# Patient Record
Sex: MALE | Race: ASIAN | NOT HISPANIC OR LATINO | Employment: FULL TIME | URBAN - METROPOLITAN AREA
[De-identification: names, ages, dates, MRNs, and addresses within clinical notes are randomized per-mention and may not be internally consistent; named-entity substitution may affect disease eponyms.]

---

## 2017-01-18 ENCOUNTER — HOSPITAL ENCOUNTER (OUTPATIENT)
Dept: OTHER | Facility: HOSPITAL | Age: 42
Setting detail: SPECIMEN
Discharge: HOME | End: 2017-01-18
Attending: FAMILY MEDICINE | Admitting: FAMILY MEDICINE

## 2017-01-18 LAB
ALBUMIN SERPL-MCNC: 3.9 G/DL (ref 3.5–4.8)
ALBUMIN/GLOB SERPL: 1.4 {RATIO} (ref 1–1.7)
ALP SERPL-CCNC: 36 IU/L (ref 32–91)
ALT SERPL-CCNC: 27 IU/L (ref 17–63)
ANION GAP SERPL CALC-SCNC: 12.2 MMOL/L (ref 10–20)
AST SERPL-CCNC: 26 IU/L (ref 15–41)
BASOPHILS # BLD AUTO: 0 10*3/UL (ref 0–0.2)
BASOPHILS NFR BLD AUTO: 1 % (ref 0–2)
BILIRUB SERPL-MCNC: 0.9 MG/DL (ref 0.3–1.2)
BUN SERPL-MCNC: 8 MG/DL (ref 8–20)
BUN/CREAT SERPL: 8.9 (ref 6.2–20.3)
CALCIUM SERPL-MCNC: 8.7 MG/DL (ref 8.9–10.3)
CHLORIDE SERPL-SCNC: 109 MMOL/L (ref 101–111)
CHOLEST SERPL-MCNC: 170 MG/DL
CHOLEST/HDLC SERPL: 3.3 {RATIO}
CONV CO2: 21 MMOL/L (ref 22–32)
CONV LDL CHOLESTEROL DIRECT: 98 MG/DL (ref 0–100)
CONV TOTAL PROTEIN: 6.6 G/DL (ref 6.1–7.9)
CREAT UR-MCNC: 0.9 MG/DL (ref 0.7–1.2)
DIFFERENTIAL METHOD BLD: (no result)
EOSINOPHIL # BLD AUTO: 0.1 10*3/UL (ref 0–0.3)
EOSINOPHIL # BLD AUTO: 2 % (ref 0–3)
ERYTHROCYTE [DISTWIDTH] IN BLOOD BY AUTOMATED COUNT: 13.2 % (ref 11.5–14.5)
GLOBULIN UR ELPH-MCNC: 2.7 G/DL (ref 2.5–3.8)
GLUCOSE SERPL-MCNC: 125 MG/DL (ref 65–99)
HCT VFR BLD AUTO: 40 % (ref 40–54)
HDLC SERPL-MCNC: 51 MG/DL
HGB BLD-MCNC: 13.5 G/DL (ref 14–18)
LDLC/HDLC SERPL: 1.9 {RATIO}
LIPID INTERPRETATION: ABNORMAL
LYMPHOCYTES # BLD AUTO: 2.3 10*3/UL (ref 0.8–4.8)
LYMPHOCYTES NFR BLD AUTO: 38 % (ref 18–42)
MCH RBC QN AUTO: 28.3 PG (ref 26–32)
MCHC RBC AUTO-ENTMCNC: 33.8 G/DL (ref 32–36)
MCV RBC AUTO: 83.6 FL (ref 80–94)
MONOCYTES # BLD AUTO: 0.4 10*3/UL (ref 0.1–1.3)
MONOCYTES NFR BLD AUTO: 6 % (ref 2–11)
NEUTROPHILS # BLD AUTO: 3.1 10*3/UL (ref 2.3–8.6)
NEUTROPHILS NFR BLD AUTO: 53 % (ref 50–75)
NRBC BLD AUTO-RTO: 0 /100{WBCS}
NRBC/RBC NFR BLD MANUAL: 0 10*3/UL
PLATELET # BLD AUTO: 190 10*3/UL (ref 150–450)
PMV BLD AUTO: 10.4 FL (ref 7.4–10.4)
POTASSIUM SERPL-SCNC: 4.2 MMOL/L (ref 3.6–5.1)
RBC # BLD AUTO: 4.79 10*6/UL (ref 4.6–6)
SODIUM SERPL-SCNC: 138 MMOL/L (ref 136–144)
T3 SERPL-MCNC: 0.92 NG/ML (ref 0.87–1.78)
T4 SERPL-MCNC: 10.41 UG/DL (ref 6.1–12.2)
TRIGL SERPL-MCNC: 186 MG/DL
VLDLC SERPL CALC-MCNC: 21 MG/DL
WBC # BLD AUTO: 5.9 10*3/UL (ref 4.5–11.5)

## 2017-08-25 ENCOUNTER — HOSPITAL ENCOUNTER (OUTPATIENT)
Dept: OTHER | Facility: HOSPITAL | Age: 42
Setting detail: SPECIMEN
Discharge: HOME | End: 2017-08-25
Attending: FAMILY MEDICINE | Admitting: FAMILY MEDICINE

## 2017-08-25 LAB
ANION GAP SERPL CALC-SCNC: 11.3 MMOL/L (ref 10–20)
BASOPHILS # BLD AUTO: 0.1 10*3/UL (ref 0–0.2)
BASOPHILS NFR BLD AUTO: 1 % (ref 0–2)
BUN SERPL-MCNC: 11 MG/DL (ref 8–20)
BUN/CREAT SERPL: 15.7 (ref 6.2–20.3)
CALCIUM SERPL-MCNC: 8.9 MG/DL (ref 8.9–10.3)
CHLORIDE SERPL-SCNC: 107 MMOL/L (ref 101–111)
CONV CO2: 25 MMOL/L (ref 22–32)
CREAT UR-MCNC: 0.7 MG/DL (ref 0.7–1.2)
DIFFERENTIAL METHOD BLD: (no result)
EOSINOPHIL # BLD AUTO: 0.2 10*3/UL (ref 0–0.3)
EOSINOPHIL # BLD AUTO: 2 % (ref 0–3)
ERYTHROCYTE [DISTWIDTH] IN BLOOD BY AUTOMATED COUNT: 13.2 % (ref 11.5–14.5)
GLUCOSE SERPL-MCNC: 106 MG/DL (ref 65–99)
HCT VFR BLD AUTO: 41.4 % (ref 40–54)
HGB BLD-MCNC: 13.9 G/DL (ref 14–18)
LYMPHOCYTES # BLD AUTO: 3.1 10*3/UL (ref 0.8–4.8)
LYMPHOCYTES NFR BLD AUTO: 40 % (ref 18–42)
MCH RBC QN AUTO: 28.4 PG (ref 26–32)
MCHC RBC AUTO-ENTMCNC: 33.5 G/DL (ref 32–36)
MCV RBC AUTO: 84.7 FL (ref 80–94)
MONOCYTES # BLD AUTO: 0.4 10*3/UL (ref 0.1–1.3)
MONOCYTES NFR BLD AUTO: 6 % (ref 2–11)
NEUTROPHILS # BLD AUTO: 3.9 10*3/UL (ref 2.3–8.6)
NEUTROPHILS NFR BLD AUTO: 51 % (ref 50–75)
NRBC BLD AUTO-RTO: 0 /100{WBCS}
NRBC/RBC NFR BLD MANUAL: 0 10*3/UL
PLATELET # BLD AUTO: 220 10*3/UL (ref 150–450)
PMV BLD AUTO: 9.2 FL (ref 7.4–10.4)
POTASSIUM SERPL-SCNC: 4.3 MMOL/L (ref 3.6–5.1)
RBC # BLD AUTO: 4.89 10*6/UL (ref 4.6–6)
SODIUM SERPL-SCNC: 139 MMOL/L (ref 136–144)
T3 SERPL-MCNC: 0.88 NG/ML (ref 0.87–1.78)
T4 SERPL-MCNC: 7.52 UG/DL (ref 6.1–12.2)
WBC # BLD AUTO: 7.7 10*3/UL (ref 4.5–11.5)

## 2018-06-20 ENCOUNTER — HOSPITAL ENCOUNTER (OUTPATIENT)
Dept: OTHER | Facility: HOSPITAL | Age: 43
Setting detail: SPECIMEN
Discharge: HOME | End: 2018-06-20
Attending: FAMILY MEDICINE | Admitting: FAMILY MEDICINE

## 2018-06-20 LAB
ALBUMIN SERPL-MCNC: 4 G/DL (ref 3.5–4.8)
ALBUMIN/GLOB SERPL: 1.4 {RATIO} (ref 1–1.7)
ALP SERPL-CCNC: 40 IU/L (ref 32–91)
ALT SERPL-CCNC: 37 IU/L (ref 17–63)
ANION GAP SERPL CALC-SCNC: 13.9 MMOL/L (ref 10–20)
AST SERPL-CCNC: 30 IU/L (ref 15–41)
BASOPHILS # BLD AUTO: 0.1 10*3/UL (ref 0–0.2)
BASOPHILS NFR BLD AUTO: 1 % (ref 0–2)
BILIRUB SERPL-MCNC: 1.2 MG/DL (ref 0.3–1.2)
BUN SERPL-MCNC: 10 MG/DL (ref 8–20)
BUN/CREAT SERPL: 12.5 (ref 6.2–20.3)
CALCIUM SERPL-MCNC: 8.7 MG/DL (ref 8.9–10.3)
CHLORIDE SERPL-SCNC: 103 MMOL/L (ref 101–111)
CHOLEST SERPL-MCNC: 215 MG/DL
CHOLEST/HDLC SERPL: 4.2 {RATIO}
CONV CO2: 22 MMOL/L (ref 22–32)
CONV LDL CHOLESTEROL DIRECT: 122 MG/DL (ref 0–100)
CONV TOTAL PROTEIN: 6.8 G/DL (ref 6.1–7.9)
CREAT UR-MCNC: 0.8 MG/DL (ref 0.7–1.2)
DIFFERENTIAL METHOD BLD: (no result)
EOSINOPHIL # BLD AUTO: 0.2 10*3/UL (ref 0–0.3)
EOSINOPHIL # BLD AUTO: 3 % (ref 0–3)
ERYTHROCYTE [DISTWIDTH] IN BLOOD BY AUTOMATED COUNT: 12.9 % (ref 11.5–14.5)
GLOBULIN UR ELPH-MCNC: 2.8 G/DL (ref 2.5–3.8)
GLUCOSE SERPL-MCNC: 130 MG/DL (ref 65–99)
HCT VFR BLD AUTO: 42.2 % (ref 40–54)
HDLC SERPL-MCNC: 51 MG/DL
HGB BLD-MCNC: 14.3 G/DL (ref 14–18)
LDLC/HDLC SERPL: 2.4 {RATIO}
LIPID INTERPRETATION: ABNORMAL
LYMPHOCYTES # BLD AUTO: 2 10*3/UL (ref 0.8–4.8)
LYMPHOCYTES NFR BLD AUTO: 31 % (ref 18–42)
MCH RBC QN AUTO: 28.7 PG (ref 26–32)
MCHC RBC AUTO-ENTMCNC: 33.9 G/DL (ref 32–36)
MCV RBC AUTO: 84.7 FL (ref 80–94)
MONOCYTES # BLD AUTO: 0.6 10*3/UL (ref 0.1–1.3)
MONOCYTES NFR BLD AUTO: 10 % (ref 2–11)
NEUTROPHILS # BLD AUTO: 3.6 10*3/UL (ref 2.3–8.6)
NEUTROPHILS NFR BLD AUTO: 55 % (ref 50–75)
NRBC BLD AUTO-RTO: 0 /100{WBCS}
NRBC/RBC NFR BLD MANUAL: 0 10*3/UL
PLATELET # BLD AUTO: 206 10*3/UL (ref 150–450)
PMV BLD AUTO: 9.5 FL (ref 7.4–10.4)
POTASSIUM SERPL-SCNC: 3.9 MMOL/L (ref 3.6–5.1)
RBC # BLD AUTO: 4.98 10*6/UL (ref 4.6–6)
SODIUM SERPL-SCNC: 135 MMOL/L (ref 136–144)
TRIGL SERPL-MCNC: 244 MG/DL
VLDLC SERPL CALC-MCNC: 42.2 MG/DL
WBC # BLD AUTO: 6.5 10*3/UL (ref 4.5–11.5)

## 2018-12-19 ENCOUNTER — HOSPITAL ENCOUNTER (OUTPATIENT)
Dept: OTHER | Facility: HOSPITAL | Age: 43
Setting detail: SPECIMEN
Discharge: HOME | End: 2018-12-19
Attending: FAMILY MEDICINE | Admitting: FAMILY MEDICINE

## 2018-12-19 LAB
ALBUMIN SERPL-MCNC: 4.1 G/DL (ref 3.5–4.8)
ALBUMIN/GLOB SERPL: 1.2 {RATIO} (ref 1–1.7)
ALP SERPL-CCNC: 43 IU/L (ref 32–91)
ALT SERPL-CCNC: 35 IU/L (ref 17–63)
ANION GAP SERPL CALC-SCNC: 15.4 MMOL/L (ref 10–20)
AST SERPL-CCNC: 28 IU/L (ref 15–41)
BILIRUB SERPL-MCNC: 0.7 MG/DL (ref 0.3–1.2)
BUN SERPL-MCNC: 14 MG/DL (ref 8–20)
BUN/CREAT SERPL: 17.5 (ref 6.2–20.3)
CALCIUM SERPL-MCNC: 9.3 MG/DL (ref 8.9–10.3)
CHLORIDE SERPL-SCNC: 102 MMOL/L (ref 101–111)
CHOLEST SERPL-MCNC: 236 MG/DL
CHOLEST/HDLC SERPL: 4.4 {RATIO}
CONV CO2: 22 MMOL/L (ref 22–32)
CONV LDL CHOLESTEROL DIRECT: 105 MG/DL (ref 0–100)
CONV TOTAL PROTEIN: 7.5 G/DL (ref 6.1–7.9)
CREAT UR-MCNC: 0.8 MG/DL (ref 0.7–1.2)
GLOBULIN UR ELPH-MCNC: 3.4 G/DL (ref 2.5–3.8)
GLUCOSE SERPL-MCNC: 136 MG/DL (ref 65–99)
HDLC SERPL-MCNC: 53 MG/DL
LDLC/HDLC SERPL: 2 {RATIO}
LIPID INTERPRETATION: ABNORMAL
POTASSIUM SERPL-SCNC: 4.4 MMOL/L (ref 3.6–5.1)
SODIUM SERPL-SCNC: 135 MMOL/L (ref 136–144)
T3 SERPL-MCNC: 0.81 NG/ML (ref 0.87–1.78)
T4 SERPL-MCNC: 9.67 UG/DL (ref 6.1–12.2)
TRIGL SERPL-MCNC: 409 MG/DL
VLDLC SERPL CALC-MCNC: 77.4 MG/DL

## 2018-12-20 LAB — HBA1C MFR BLD: 7.3 % (ref 0–5.6)

## 2019-11-06 ENCOUNTER — OFFICE VISIT (OUTPATIENT)
Dept: FAMILY MEDICINE CLINIC | Facility: CLINIC | Age: 44
End: 2019-11-06

## 2019-11-06 VITALS
BODY MASS INDEX: 26.67 KG/M2 | SYSTOLIC BLOOD PRESSURE: 124 MMHG | HEIGHT: 68 IN | OXYGEN SATURATION: 98 % | WEIGHT: 176 LBS | DIASTOLIC BLOOD PRESSURE: 77 MMHG | HEART RATE: 62 BPM | TEMPERATURE: 98.2 F

## 2019-11-06 DIAGNOSIS — E11.9 TYPE 2 DIABETES MELLITUS WITHOUT COMPLICATION, WITHOUT LONG-TERM CURRENT USE OF INSULIN (HCC): ICD-10-CM

## 2019-11-06 DIAGNOSIS — L30.9 ECZEMA, UNSPECIFIED TYPE: ICD-10-CM

## 2019-11-06 DIAGNOSIS — E03.9 ACQUIRED HYPOTHYROIDISM: ICD-10-CM

## 2019-11-06 DIAGNOSIS — R55 SYNCOPE AND COLLAPSE: ICD-10-CM

## 2019-11-06 DIAGNOSIS — R42 DIZZINESS: Primary | ICD-10-CM

## 2019-11-06 DIAGNOSIS — I49.9 IRREGULAR HEART BEAT: ICD-10-CM

## 2019-11-06 DIAGNOSIS — Z23 FLU VACCINE NEED: ICD-10-CM

## 2019-11-06 DIAGNOSIS — Z48.02 ENCOUNTER FOR STAPLE REMOVAL: ICD-10-CM

## 2019-11-06 DIAGNOSIS — E78.2 MULTIPLE-TYPE HYPERLIPIDEMIA: ICD-10-CM

## 2019-11-06 LAB
DEPRECATED RDW RBC AUTO: 39.5 FL (ref 37–54)
ERYTHROCYTE [DISTWIDTH] IN BLOOD BY AUTOMATED COUNT: 12.5 % (ref 12.3–15.4)
HBA1C MFR BLD: 5.9 % (ref 3.5–5.6)
HCT VFR BLD AUTO: 44 % (ref 37.5–51)
HGB BLD-MCNC: 14.4 G/DL (ref 13–17.7)
MCH RBC QN AUTO: 28.3 PG (ref 26.6–33)
MCHC RBC AUTO-ENTMCNC: 32.7 G/DL (ref 31.5–35.7)
MCV RBC AUTO: 86.6 FL (ref 79–97)
PLATELET # BLD AUTO: 222 10*3/MM3 (ref 140–450)
PMV BLD AUTO: 11.5 FL (ref 6–12)
RBC # BLD AUTO: 5.08 10*6/MM3 (ref 4.14–5.8)
WBC NRBC COR # BLD: 7.16 10*3/MM3 (ref 3.4–10.8)

## 2019-11-06 PROCEDURE — 80061 LIPID PANEL: CPT | Performed by: NURSE PRACTITIONER

## 2019-11-06 PROCEDURE — 99214 OFFICE O/P EST MOD 30 MIN: CPT | Performed by: NURSE PRACTITIONER

## 2019-11-06 PROCEDURE — 36415 COLL VENOUS BLD VENIPUNCTURE: CPT | Performed by: NURSE PRACTITIONER

## 2019-11-06 PROCEDURE — 85027 COMPLETE CBC AUTOMATED: CPT | Performed by: NURSE PRACTITIONER

## 2019-11-06 PROCEDURE — 90674 CCIIV4 VAC NO PRSV 0.5 ML IM: CPT | Performed by: NURSE PRACTITIONER

## 2019-11-06 PROCEDURE — 90471 IMMUNIZATION ADMIN: CPT | Performed by: NURSE PRACTITIONER

## 2019-11-06 PROCEDURE — 80048 BASIC METABOLIC PNL TOTAL CA: CPT | Performed by: NURSE PRACTITIONER

## 2019-11-06 PROCEDURE — 84443 ASSAY THYROID STIM HORMONE: CPT | Performed by: NURSE PRACTITIONER

## 2019-11-06 PROCEDURE — 83036 HEMOGLOBIN GLYCOSYLATED A1C: CPT | Performed by: NURSE PRACTITIONER

## 2019-11-06 RX ORDER — LEVOTHYROXINE SODIUM 175 UG/1
175 TABLET ORAL DAILY
Refills: 1 | COMMUNITY
Start: 2019-11-01 | End: 2019-12-23 | Stop reason: SDUPTHER

## 2019-11-06 RX ORDER — CLOBETASOL PROPIONATE 0.5 MG/G
OINTMENT TOPICAL
Qty: 30 G | Refills: 1 | Status: SHIPPED | OUTPATIENT
Start: 2019-11-06 | End: 2021-08-26

## 2019-11-06 NOTE — PROGRESS NOTES
"  Naveen Zapata is a 44 y.o. male.     Chief Complaint   Patient presents with   • Loss of Consciousness     Injured chin and head, would like for you to look at stitches and staples to have removed.  Happened two weeks ago.   • Dizziness     Since the fall.   • Eczema     Patient would like for you to look at patch of dry skin and would like ointment.       History of Present Illness as mentioned above in CC, he reports he travels a lot for work and was working out at the [employer] gym at 4 am 2 weeks ago, he felt funny and went to go to his room and fell hitting his head on the floor by the elevator, he went by EMS to [facility]. EMS reports irregular HR, he felt no chest pain, but some irregular HR feeling. All cardiac work up ok, including echo, Negative stress test and CT scan brain normal. He has 3 stitches in his chin and one staple in the back of his head. He reports the headache has resolved, although he still feels dizzy when looking up/down. Lying to standing develops dizziness as well. Mother had cabg at age 61-62. father  of cardiac arrest at age 63    DMII he reports a hgba1c 6.1 at hospital, eats well/works out, doesn't sleep well wakes up after 4 hours, then can't go back to sleep, checks BG once weekly, needs strips pt doesn't remember name of glucometer.     TSH \"low at hospital\"    Eczema comes and goes in one spot to the right of the belly button    Subjective  as mentioned above    Vitals:    19 0821   BP: 124/77   Pulse: 62   Temp: 98.2 °F (36.8 °C)   SpO2: 98%       The following portions of the patient's history were reviewed and updated as appropriate: allergies, current medications, past family history, past medical history, past social history, past surgical history and problem list.    Review of Systems   Constitutional: Negative for chills, fatigue and fever.   HENT: Negative for dental problem, ear pain, sinus pressure and sore throat.         Sutures present in chin and 1 " staple in back of the head     Eyes: Negative for visual disturbance.   Respiratory: Negative for cough, shortness of breath and wheezing.    Gastrointestinal: Negative for abdominal pain, blood in stool, constipation, diarrhea, nausea, vomiting and GERD.   Genitourinary: Negative for difficulty urinating, frequency, urgency and urinary incontinence.   Musculoskeletal: Negative for arthralgias, back pain, gait problem, joint swelling, myalgias and neck pain.   Skin: Negative for dry skin, pallor and rash.   Neurological: Positive for dizziness, syncope (one isolated episode), light-headedness and headache (now resolved). Negative for seizures, speech difficulty, weakness, memory problem and confusion.   Hematological: Negative for adenopathy.   Psychiatric/Behavioral: Positive for sleep disturbance. Negative for depressed mood and stress. The patient is not nervous/anxious.        Objective     Physical Exam   Constitutional: He is oriented to person, place, and time. He appears well-developed and well-nourished. No distress.   HENT:   Head: Normocephalic. Head is with laceration (lacerations as noted with sutures/staples present, both lac's healed well, approximated, no erythema or drainage. ).       Eyes: Conjunctivae and EOM are normal. Pupils are equal, round, and reactive to light.   Neck: Normal range of motion. Neck supple. No JVD present. No thyromegaly present.   Cardiovascular: Normal rate, regular rhythm, S1 normal, S2 normal and normal heart sounds. Exam reveals no gallop, no S3, no S4, no distant heart sounds and no friction rub.   No murmur heard.  Pulmonary/Chest: Effort normal and breath sounds normal.   Abdominal: Soft. Bowel sounds are normal. He exhibits no distension. There is no tenderness.   Musculoskeletal: Normal range of motion. He exhibits no edema or tenderness.   Neurological: He is alert and oriented to person, place, and time. No sensory deficit.   Skin: Skin is warm and dry. Rash (dry  scale/patch laterally to the right of umbilicus) noted. He is not diaphoretic. No erythema.   Psychiatric: He has a normal mood and affect. His behavior is normal. Judgment normal.   Nursing note and vitals reviewed.        Assessment/Plan   Naveen was seen today for loss of consciousness, dizziness and eczema.    Diagnoses and all orders for this visit:    Dizziness  -     Basic Metabolic Panel  -     CBC (No Diff)    Syncope and collapse  -     Ambulatory Referral to Cardiology    Encounter for staple removal    Multiple-type hyperlipidemia  -     Lipid Panel    Type 2 diabetes mellitus without complication, without long-term current use of insulin (CMS/[facility])  -     Hemoglobin A1c    Eczema, unspecified type    Acquired hypothyroidism  -     TSH    Irregular heart beat  -     Ambulatory Referral to Cardiology    Other orders  -     clobetasol (TEMOVATE) 0.05 % ointment; Apply BID to affected area as needed.      Procedure: x 3 sutures removed from distal chin area was cleansed with alcohol prior to removal.  The laceration was approximated, patient tolerated well.  Then x1 staple on the posterior head was cleansed with alcohol and removed with a staple removal kit without difficulty, the patient tolerated the procedure well.      Request records from [facility], [facility] in [place], can make copies for pt to take to cardiology appt. Pt should request echo/stress disc to be mailed.    pt is fasting today, check labs, notify results   Ok for clotrimazole to eczematous area  Flu shot today  Staple/suture removal   Does not need med refills at this time. Will review labs make changes to meds if needed and see pt back in 6 months, otherwise earlier as needed.         Glucose   Date Value Ref Range Status   12/19/2018 136 (H) 65 - 99 mg/dL Final     BUN   Date Value Ref Range Status   12/19/2018 14 8 - 20 mg/dL Final     Creatinine   Date Value Ref Range Status   12/19/2018 0.8 0.7 - 1.2 mg/dl  Final     Sodium   Date Value Ref Range Status   12/19/2018 135 (L) 136 - 144 mmol/L Final     Potassium   Date Value Ref Range Status   12/19/2018 4.4 3.6 - 5.1 mmol/L Final     Chloride   Date Value Ref Range Status   12/19/2018 102 101 - 111 mmol/L Final     CO2   Date Value Ref Range Status   12/19/2018 22 22 - 32 mmol/L Final     Calcium   Date Value Ref Range Status   12/19/2018 9.3 8.9 - 10.3 mg/dL Final     Total Protein   Date Value Ref Range Status   12/19/2018 7.5 6.1 - 7.9 g/dL Final     Albumin   Date Value Ref Range Status   12/19/2018 4.1 3.5 - 4.8 g/dL Final     ALT (SGPT)   Date Value Ref Range Status   12/19/2018 35 17 - 63 IU/L Final     AST (SGOT)   Date Value Ref Range Status   12/19/2018 28 15 - 41 IU/L Final     Alkaline Phosphatase   Date Value Ref Range Status   12/19/2018 43 32 - 91 IU/L Final     Total Bilirubin   Date Value Ref Range Status   12/19/2018 0.7 0.3 - 1.2 mg/dL Final     A/G Ratio   Date Value Ref Range Status   12/19/2018 1.2 1.0 - 1.7 Final     BUN/Creatinine Ratio   Date Value Ref Range Status   12/19/2018 17.5 6.2 - 20.3 Final     Anion Gap   Date Value Ref Range Status   12/19/2018 15.4 10 - 20 Final

## 2019-11-06 NOTE — PATIENT INSTRUCTIONS
Will request records and may need cardiology work up  Will get labs today and notify results  Ok for clotrimazole   Flu shot  Staple/suture removal.

## 2019-11-07 LAB
ANION GAP SERPL CALCULATED.3IONS-SCNC: 14.2 MMOL/L (ref 5–15)
BUN BLD-MCNC: 10 MG/DL (ref 6–20)
BUN/CREAT SERPL: 14.1 (ref 7–25)
CALCIUM SPEC-SCNC: 9.3 MG/DL (ref 8.6–10.5)
CHLORIDE SERPL-SCNC: 102 MMOL/L (ref 98–107)
CHOLEST SERPL-MCNC: 188 MG/DL (ref 0–200)
CO2 SERPL-SCNC: 22.8 MMOL/L (ref 22–29)
CREAT BLD-MCNC: 0.71 MG/DL (ref 0.76–1.27)
GFR SERPL CREATININE-BSD FRML MDRD: 121 ML/MIN/1.73
GFR SERPL CREATININE-BSD FRML MDRD: 147 ML/MIN/1.73
GLUCOSE BLD-MCNC: 116 MG/DL (ref 65–99)
HDLC SERPL-MCNC: 57 MG/DL (ref 40–60)
LDLC SERPL CALC-MCNC: 103 MG/DL (ref 0–100)
LDLC/HDLC SERPL: 1.8 {RATIO}
POTASSIUM BLD-SCNC: 4.2 MMOL/L (ref 3.5–5.2)
SODIUM BLD-SCNC: 139 MMOL/L (ref 136–145)
TRIGL SERPL-MCNC: 141 MG/DL (ref 0–150)
TSH SERPL DL<=0.05 MIU/L-ACNC: 0.5 UIU/ML (ref 0.27–4.2)
VLDLC SERPL-MCNC: 28.2 MG/DL (ref 5–40)

## 2019-12-03 PROBLEM — Z92.89 HISTORY OF STRESS TEST: Status: ACTIVE | Noted: 2019-10-24

## 2019-12-03 PROBLEM — Z92.89 HISTORY OF ECHOCARDIOGRAM: Status: ACTIVE | Noted: 2019-10-23

## 2019-12-04 ENCOUNTER — OFFICE VISIT (OUTPATIENT)
Dept: CARDIOLOGY | Facility: CLINIC | Age: 44
End: 2019-12-04

## 2019-12-04 VITALS
DIASTOLIC BLOOD PRESSURE: 74 MMHG | HEART RATE: 68 BPM | SYSTOLIC BLOOD PRESSURE: 114 MMHG | WEIGHT: 181 LBS | HEIGHT: 68 IN | RESPIRATION RATE: 18 BRPM | BODY MASS INDEX: 27.43 KG/M2

## 2019-12-04 DIAGNOSIS — R55 SYNCOPE AND COLLAPSE: Primary | ICD-10-CM

## 2019-12-04 PROCEDURE — 93000 ELECTROCARDIOGRAM COMPLETE: CPT | Performed by: INTERNAL MEDICINE

## 2019-12-04 PROCEDURE — 99204 OFFICE O/P NEW MOD 45 MIN: CPT | Performed by: INTERNAL MEDICINE

## 2019-12-04 NOTE — PROGRESS NOTES
Cardiology clinic note  Pramod Wagner MD, PhD  [facility] cardiology    Subjective:     Encounter Date:12/04/2019      Patient ID: Naveen Zapata is a 44 y.o. male.    Chief Complaint:  Chief Complaint   Patient presents with   • Loss of Consciousness       HPI:  History of Present Illness  I had the pleasure of seeing this very pleasant 44-year-old  male today who presents to clinic as a referral from GUSTABO Schneider with chief complaint of syncope and collapse approximately 2 months ago.  He was on a business trip in [facility] and was working out on the elliptical machine and got off with continued fast heart rate and palpitations with dizziness and proceeded to get on the elevator with syncopal episode hitting his head with sustaining laceration.  EKG showed baseline repolarization abnormality which is also present today and 2D echo at the outside hospital in [place] in October showed only mild concentric LVH, no significant valvulopathy, EF 65% with no wall motion abnormality, normal RV size and function normal atrial size and function and normal right and left-sided pressure estimation.  Lexiscan stress was also performed with normal myocardial perfusion at rest and stress imaging with no indication for ischemia.  He has not currently had any outpatient Holter monitor and since this time he is essentially been asymptomatic without palpitations.  EKG tracing reviewed by me today reveals sinus rhythm with no high risk features of any QT prolongation, delta wave, abnormal axis or bundle branch block or abnormal conduction essentially.  Otherwise he has been healthy with no history of coronary disease, no structural heart disease, no valvular disease although he does have a family history of mother with bypass surgery and a father with MI at age 63 and passed away at that time with previous stroke.  He is a former smoker quit 2013 with a pack per month x20 years which would be  approximately 5-year pack smoking history.  Underlying renal function normal creatinine 1.2 on medical record review.  He does have non-insulin-dependent diabetes medicine maintained on metformin as well as hypothyroidism on thyroid replacement.  No heart failure signs or symptoms.  No chest pain.  No recurrent syncope or palpitations since the initial event although he is not resumed exercise regularly.    Review of systems a 14 point review system negative except was mentioned above    Echo records from outside hospital reviewed and discussed with patient at length    Historical data copied forward from prior EMR and medical records as unchanged      The following portions of the patient's history were reviewed and updated as appropriate: allergies, current medications, past family history, past medical history, past social history, past surgical history and problem list.    Problem List:  Patient Active Problem List   Diagnosis   • Diabetes mellitus, type 2 (CMS/HCC)   • Encounter for immunization   • Hypothyroidism   • Multiple-type hyperlipidemia   • History of stress test   • History of echocardiogram   • Syncope and collapse       Past Medical History:  Past Medical History:   Diagnosis Date   • History of echocardiogram 10/23/2019    Normal chamber sizes. Normal LV and RV systolic function. EF 65% Normal wall motion.    • History of stress test 10/24/2019    Normal nuclear perfuaion stress without evidence of ischemia.    • Hypothyroidism    • Syncope and collapse    • Type 2 diabetes mellitus (CMS/HCC)        Past Surgical History:  Past Surgical History:   Procedure Laterality Date   • NO PAST SURGERIES         Social History:  Social History     Socioeconomic History   • Marital status:      Spouse name: Not on file   • Number of children: Not on file   • Years of education: Not on file   • Highest education level: Not on file   Tobacco Use   • Smoking status: Former Smoker     Years: 20.00      "Types: Cigarettes     Last attempt to quit: 2013     Years since quittin.9   • Tobacco comment: Patient states he smoked 1 pack per month   Substance and Sexual Activity   • Alcohol use: No     Frequency: Never   • Drug use: No   • Sexual activity: Defer       Allergies:  No Known Allergies    Immunizations:  Immunization History   Administered Date(s) Administered   • Flu Vaccine Intradermal Quad 18-64YR 2018   • flucelvax quad pfs =>4 YRS 2019       ROS:  ROS  Negative x14 point review of systems     Objective:         /74 (BP Location: Left arm, Patient Position: Sitting)   Pulse 68   Resp 18   Ht 172.7 cm (68\")   Wt 82.1 kg (181 lb)   BMI 27.52 kg/m²     Physical Exam  No acute distress alert oriented x3 afebrile vital sign stable  Normocephalic atraumatic pupils equal round extraocular mistake bilateral  Trachea midline neck supple no carotid bruits  Regular rate and rhythm no rubs murmurs or gallops  Pulses 2+ bilaterally  Clear to auscultation bilaterally  Abdomen soft nontender nondistended bowel sounds positive  No clubbing cyanosis or edema  No neurologic deficits grossly  No bony abnormalities  Normal mood and affect  Vitals reviewed  Skin and extremities warm and dry  In-Office Procedure(s):    ECG 12 Lead  Date/Time: 2019 10:14 AM  Performed by: Pramod Wagner MD  Authorized by: Pramod Wagner MD   Comparison: not compared with previous ECG   Previous ECG: no previous ECG available  Rhythm: sinus rhythm  Rate: normal  ST Segments: ST segments normal  T Waves: T waves normal  QRS axis: normal    Clinical impression: normal ECG            ASCVD RIsk Score::  The 10-year ASCVD risk score (Ant[place] YANDY Mendoza, et al., 2013) is: 1.9%    Values used to calculate the score:      Age: 44 years      Sex: Male      Is Non- : No      Diabetic: Yes      Tobacco smoker: No      Systolic Blood Pressure: 114 mmHg      Is BP treated: No      HDL " Cholesterol: 57 mg/dL      Total Cholesterol: 188 mg/dL    Recent Radiology:  Imaging Results (Most Recent)     None          Lab Review:   Office Visit on 11/06/2019   Component Date Value   • Glucose 11/06/2019 116*   • BUN 11/06/2019 10    • Creatinine 11/06/2019 0.71*   • Sodium 11/06/2019 139    • Potassium 11/06/2019 4.2    • Chloride 11/06/2019 102    • CO2 11/06/2019 22.8    • Calcium 11/06/2019 9.3    • eGFR   Amer 11/06/2019 147    • eGFR Non  Amer 11/06/2019 121    • BUN/Creatinine Ratio 11/06/2019 14.1    • Anion Gap 11/06/2019 14.2    • WBC 11/06/2019 7.16    • RBC 11/06/2019 5.08    • Hemoglobin 11/06/2019 14.4    • Hematocrit 11/06/2019 44.0    • MCV 11/06/2019 86.6    • MCH 11/06/2019 28.3    • MCHC 11/06/2019 32.7    • RDW 11/06/2019 12.5    • RDW-SD 11/06/2019 39.5    • MPV 11/06/2019 11.5    • Platelets 11/06/2019 222    • Total Cholesterol 11/06/2019 188    • Triglycerides 11/06/2019 141    • HDL Cholesterol 11/06/2019 57    • LDL Cholesterol  11/06/2019 103*   • VLDL Cholesterol 11/06/2019 28.2    • LDL/HDL Ratio 11/06/2019 1.80    • TSH 11/06/2019 0.501    • Hemoglobin A1C 11/06/2019 5.9*                Assessment:          Diagnosis Plan   1. Syncope and collapse  Holter Monitor - 72 Hour Up To 21 Days          Plan:      Syncope and collapse  Echo essentially normal  Stress test unremarkable with normal perfusion  ZIO Patch for 2 weeks with patient triggered events  Baseline EKG tracing reviewed by me today normal sinus rhythm with no high risk features  TSH and labs essentially unremarkable from outside hospital    See back in 3 months with results of ZIO Patch, conservative management at this time    Non-insulin-dependent diabetes, goal A1c less than 7    Primary prevention goals for CAD and cardiac comorbidities discussed with patient, fasting lipid panels as outpatient per guidelines    At the pleasure to be involved in his cardiovascular care.  Please call any questions or  concerns  Pramod Wagner MD, PhD       Level of Care:                 Pramod Wagner MD  12/04/19  .

## 2019-12-23 ENCOUNTER — TELEPHONE (OUTPATIENT)
Dept: FAMILY MEDICINE CLINIC | Facility: CLINIC | Age: 44
End: 2019-12-23

## 2019-12-23 RX ORDER — LEVOTHYROXINE SODIUM 175 UG/1
175 TABLET ORAL DAILY
Qty: 90 TABLET | Refills: 0 | Status: SHIPPED | OUTPATIENT
Start: 2019-12-23 | End: 2020-02-26

## 2019-12-23 NOTE — TELEPHONE ENCOUNTER
Patient is traveling soon and asked if he could have 120 day supply of his levothyroxine and metformin?

## 2020-02-26 RX ORDER — LEVOTHYROXINE SODIUM 175 UG/1
175 TABLET ORAL DAILY
Qty: 90 TABLET | Refills: 0 | Status: SHIPPED | OUTPATIENT
Start: 2020-02-26 | End: 2020-04-27 | Stop reason: SDUPTHER

## 2020-04-24 ENCOUNTER — TELEPHONE (OUTPATIENT)
Dept: FAMILY MEDICINE CLINIC | Facility: CLINIC | Age: 45
End: 2020-04-24

## 2020-04-24 NOTE — TELEPHONE ENCOUNTER
Pt 's wife called.  I really struggled understanding patient, however she wanted to know if you could call her about a prescription for her . Her provided [phone]. Thanks bm

## 2020-04-27 RX ORDER — LEVOTHYROXINE SODIUM 175 UG/1
175 TABLET ORAL DAILY
Qty: 90 TABLET | Refills: 0 | Status: SHIPPED | OUTPATIENT
Start: 2020-04-27 | End: 2020-06-08 | Stop reason: SDUPTHER

## 2020-06-08 ENCOUNTER — TELEPHONE (OUTPATIENT)
Dept: FAMILY MEDICINE CLINIC | Facility: CLINIC | Age: 45
End: 2020-06-08

## 2020-06-08 RX ORDER — LEVOTHYROXINE SODIUM 175 UG/1
175 TABLET ORAL DAILY
Qty: 90 TABLET | Refills: 2 | Status: SHIPPED | OUTPATIENT
Start: 2020-06-08 | End: 2020-08-18 | Stop reason: SDUPTHER

## 2020-06-08 NOTE — TELEPHONE ENCOUNTER
Pt left vm stating he is in need of medication.  Did not state was medications he needed, he said he had been out of town or the country not sure which he said.  Can you see what medications are needed and please call him.  Thanks

## 2020-06-18 ENCOUNTER — OFFICE VISIT (OUTPATIENT)
Dept: FAMILY MEDICINE CLINIC | Facility: CLINIC | Age: 45
End: 2020-06-18

## 2020-06-18 VITALS
BODY MASS INDEX: 27.46 KG/M2 | WEIGHT: 181.2 LBS | DIASTOLIC BLOOD PRESSURE: 78 MMHG | TEMPERATURE: 97.3 F | SYSTOLIC BLOOD PRESSURE: 115 MMHG | OXYGEN SATURATION: 98 % | HEIGHT: 68 IN | HEART RATE: 67 BPM

## 2020-06-18 DIAGNOSIS — E11.65 TYPE 2 DIABETES MELLITUS WITH HYPERGLYCEMIA, WITHOUT LONG-TERM CURRENT USE OF INSULIN (HCC): Primary | ICD-10-CM

## 2020-06-18 DIAGNOSIS — E03.9 ACQUIRED HYPOTHYROIDISM: ICD-10-CM

## 2020-06-18 DIAGNOSIS — E78.2 MULTIPLE-TYPE HYPERLIPIDEMIA: ICD-10-CM

## 2020-06-18 PROCEDURE — 99214 OFFICE O/P EST MOD 30 MIN: CPT | Performed by: NURSE PRACTITIONER

## 2020-06-18 RX ORDER — LANCETS 28 GAUGE
EACH MISCELLANEOUS
Qty: 100 EACH | Refills: 11 | Status: SHIPPED | OUTPATIENT
Start: 2020-06-18

## 2020-06-18 RX ORDER — BLOOD-GLUCOSE METER
KIT MISCELLANEOUS
Qty: 1 EACH | Refills: 0 | Status: SHIPPED | OUTPATIENT
Start: 2020-06-18

## 2020-06-18 NOTE — PROGRESS NOTES
Chief Complaint   Patient presents with   • Diabetes     [place] was [place] lockdown in [place] country and ran out of his Rx.  He saw a dr who gave him a different medication.  he just wants to confirm which one he should be taking   • Follow-up       HPI     Diabetes mellitus type II, he is now back on metformin 500 mg twice daily for the last 2 weeks, feels stable on meds and reports blood glucose ranging 140-150 but needs new glucometer. Denies any signs/symptoms of hyper/hypoglycemia, blurry vision, polydipsia, polyuria, nocturia, and unintentional weight loss, while in [place] he was taking janumet  and jardiance d/t severe high BG in 300s, ran out 2 weeks ago.     Hypothyroidism, stable on medication, denies symptoms of constipation, weight gain/loss, hot or cold intolerance, hair loss, abnormal heart rate, syncope, lightheadedness and fatigue.     The following portions of the patient's history were reviewed and updated as appropriate: allergies, current medications, past family history, past medical history, past social history, past surgical history and problem list.    Past Medical History:   Diagnosis Date   • History of echocardiogram 10/23/2019   • History of stress test 10/24/2019   • Hypothyroidism    • Syncope and collapse    • Type 2 diabetes mellitus (CMS/[facility])      Past Surgical History:   Procedure Laterality Date   • NO PAST SURGERIES       Family History   Problem Relation Age of Onset   • Diabetes Other    • Heart disease Mother    • Heart attack Father    • Stroke Father      Social History     Tobacco Use   • Smoking status: Former Smoker     Years: 20.00     Types: Cigarettes     Last attempt to quit: 2013     Years since quittin.4   • Tobacco comment: Patient states he smoked 1 pack per month   Substance Use Topics   • Alcohol use: No     Frequency: Never         Current Outpatient Medications:   •  clobetasol (TEMOVATE) 0.05 % ointment, Apply BID to affected area as needed., Disp: 30 g,  "Rfl: 1  •  levothyroxine (SYNTHROID, LEVOTHROID) 175 MCG tablet, Take 1 tablet by mouth Daily., Disp: 90 tablet, Rfl: 2  •  metFORMIN (GLUCOPHAGE) 500 MG tablet, Take 1 tablet by mouth 2 (Two) Times a Day With Meals., Disp: 180 tablet, Rfl: 2  •  glucose blood (FREESTYLE TEST STRIPS) test strip, Use to check blood glucose twice daily as needed DX: E11.65, Disp: 100 each, Rfl: 11  •  glucose monitoring kit (FREESTYLE) monitoring kit, Please provide glucometer on patient's formulary to check blood glucose twice daily. DX: E11.65, Disp: 1 each, Rfl: 0  •  Lancets (FREESTYLE) lancets, Use to check blood glucose twice daily as needed DX: E11.65, Disp: 100 each, Rfl: 11      Review of Systems       A full 12 point review of systems has been obtained as mentioned in HPI, otherwise negative      Vitals:    06/18/20 0948   BP: 115/78   BP Location: Left arm   Patient Position: Sitting   Cuff Size: Adult   Pulse: 67   Temp: 97.3 °F (36.3 °C)   TempSrc: Skin   SpO2: 98%   Weight: 82.2 kg (181 lb 3.2 oz)   Height: 172.7 cm (67.99\")     Body mass index is 27.56 kg/m².    Physical Exam   Constitutional: He is oriented to person, place, and time. He appears well-developed and well-nourished. No distress.   HENT:   Head: Normocephalic and atraumatic.   Eyes: Pupils are equal, round, and reactive to light.   Neck: Normal range of motion. No thyromegaly present.   Cardiovascular: Normal rate, regular rhythm, normal heart sounds and intact distal pulses.   Pulmonary/Chest: Effort normal and breath sounds normal. No respiratory distress.   Abdominal: Soft. Bowel sounds are normal. He exhibits no distension. There is no tenderness.   Musculoskeletal: Normal range of motion.   Neurological: He is alert and oriented to person, place, and time.   Skin: Skin is warm and dry. He is not diaphoretic. No erythema.   Psychiatric: He has a normal mood and affect. His behavior is normal. Judgment and thought content normal.   Nursing note and " vitals reviewed.      No visits with results within 7 Day(s) from this visit.   Latest known visit with results is:   Office Visit on 11/06/2019   Component Date Value Ref Range Status   • Glucose 11/06/2019 116* 65 - 99 mg/dL Final   • BUN 11/06/2019 10  6 - 20 mg/dL Final   • Creatinine 11/06/2019 0.71* 0.76 - 1.27 mg/dL Final   • Sodium 11/06/2019 139  136 - 145 mmol/L Final   • Potassium 11/06/2019 4.2  3.5 - 5.2 mmol/L Final   • Chloride 11/06/2019 102  98 - 107 mmol/L Final   • CO2 11/06/2019 22.8  22.0 - 29.0 mmol/L Final   • Calcium 11/06/2019 9.3  8.6 - 10.5 mg/dL Final   • eGFR  African Amer 11/06/2019 147  >60 mL/min/1.73 Final   • eGFR Non African Amer 11/06/2019 121  >60 mL/min/1.73 Final   • BUN/Creatinine Ratio 11/06/2019 14.1  7.0 - 25.0 Final   • Anion Gap 11/06/2019 14.2  5.0 - 15.0 mmol/L Final   • WBC 11/06/2019 7.16  3.40 - 10.80 10*3/mm3 Final   • RBC 11/06/2019 5.08  4.14 - 5.80 10*6/mm3 Final   • Hemoglobin 11/06/2019 14.4  13.0 - 17.7 g/dL Final   • Hematocrit 11/06/2019 44.0  37.5 - 51.0 % Final   • MCV 11/06/2019 86.6  79.0 - 97.0 fL Final   • MCH 11/06/2019 28.3  26.6 - 33.0 pg Final   • MCHC 11/06/2019 32.7  31.5 - 35.7 g/dL Final   • RDW 11/06/2019 12.5  12.3 - 15.4 % Final   • RDW-SD 11/06/2019 39.5  37.0 - 54.0 fl Final   • MPV 11/06/2019 11.5  6.0 - 12.0 fL Final   • Platelets 11/06/2019 222  140 - 450 10*3/mm3 Final   • Total Cholesterol 11/06/2019 188  0 - 200 mg/dL Final   • Triglycerides 11/06/2019 141  0 - 150 mg/dL Final   • HDL Cholesterol 11/06/2019 57  40 - 60 mg/dL Final   • LDL Cholesterol  11/06/2019 103* 0 - 100 mg/dL Final   • VLDL Cholesterol 11/06/2019 28.2  5 - 40 mg/dL Final   • LDL/HDL Ratio 11/06/2019 1.80   Final   • TSH 11/06/2019 0.501  0.270 - 4.200 uIU/mL Final   • Hemoglobin A1C 11/06/2019 5.9* 3.5 - 5.6 % Final       Diagnoses and all orders for this visit:    1. Type 2 diabetes mellitus with hyperglycemia, without long-term current use of insulin (CMS/[facility])  (Primary)    2. Multiple-type hyperlipidemia    3. Acquired hypothyroidism    Other orders  -     glucose blood (FREESTYLE TEST STRIPS) test strip; Use to check blood glucose twice daily as needed DX: E11.65  Dispense: 100 each; Refill: 11  -     Lancets (FREESTYLE) lancets; Use to check blood glucose twice daily as needed DX: E11.65  Dispense: 100 each; Refill: 11  -     glucose monitoring kit (FREESTYLE) monitoring kit; Please provide glucometer on patient's formulary to check blood glucose twice daily. DX: E11.65  Dispense: 1 each; Refill: 0      -Last A1c 5.9 patient was instructed to decrease metformin to once daily. However out of the country with hyperglycemia he was started on janumet and jardiance but ran out weeks ago, recommend him now continue metformin 500 mg twice daily as bg running <150.   -ordered new glucometer  -holding on labs today d/t multiple med changes   -Call for blood glucose greater than 200 consistently, adjust meds as needed  -Return to office in 8 weeks for fasting diabetic panel and TSH with appointment following to discuss

## 2020-08-11 ENCOUNTER — LAB (OUTPATIENT)
Dept: FAMILY MEDICINE CLINIC | Facility: CLINIC | Age: 45
End: 2020-08-11

## 2020-08-11 DIAGNOSIS — E03.9 ACQUIRED HYPOTHYROIDISM: ICD-10-CM

## 2020-08-11 DIAGNOSIS — E11.65 TYPE 2 DIABETES MELLITUS WITH HYPERGLYCEMIA, WITHOUT LONG-TERM CURRENT USE OF INSULIN (HCC): Primary | ICD-10-CM

## 2020-08-11 PROCEDURE — 85027 COMPLETE CBC AUTOMATED: CPT | Performed by: NURSE PRACTITIONER

## 2020-08-11 PROCEDURE — 83036 HEMOGLOBIN GLYCOSYLATED A1C: CPT | Performed by: NURSE PRACTITIONER

## 2020-08-11 PROCEDURE — 80053 COMPREHEN METABOLIC PANEL: CPT | Performed by: NURSE PRACTITIONER

## 2020-08-11 PROCEDURE — 80061 LIPID PANEL: CPT | Performed by: NURSE PRACTITIONER

## 2020-08-11 PROCEDURE — 36415 COLL VENOUS BLD VENIPUNCTURE: CPT | Performed by: NURSE PRACTITIONER

## 2020-08-11 PROCEDURE — 84443 ASSAY THYROID STIM HORMONE: CPT | Performed by: NURSE PRACTITIONER

## 2020-08-12 LAB
ALBUMIN SERPL-MCNC: 3.9 G/DL (ref 3.5–5.2)
ALBUMIN/GLOB SERPL: 1.4 G/DL
ALP SERPL-CCNC: 37 U/L (ref 39–117)
ALT SERPL W P-5'-P-CCNC: 31 U/L (ref 1–41)
ANION GAP SERPL CALCULATED.3IONS-SCNC: 12.2 MMOL/L (ref 5–15)
AST SERPL-CCNC: 23 U/L (ref 1–40)
BILIRUB SERPL-MCNC: 0.8 MG/DL (ref 0–1.2)
BUN SERPL-MCNC: 6 MG/DL (ref 6–20)
BUN/CREAT SERPL: 9.5 (ref 7–25)
CALCIUM SPEC-SCNC: 8.6 MG/DL (ref 8.6–10.5)
CHLORIDE SERPL-SCNC: 105 MMOL/L (ref 98–107)
CHOLEST SERPL-MCNC: 222 MG/DL (ref 0–200)
CO2 SERPL-SCNC: 21.8 MMOL/L (ref 22–29)
CREAT SERPL-MCNC: 0.63 MG/DL (ref 0.76–1.27)
DEPRECATED RDW RBC AUTO: 43.8 FL (ref 37–54)
ERYTHROCYTE [DISTWIDTH] IN BLOOD BY AUTOMATED COUNT: 13.9 % (ref 12.3–15.4)
GFR SERPL CREATININE-BSD FRML MDRD: 138 ML/MIN/1.73
GFR SERPL CREATININE-BSD FRML MDRD: >150 ML/MIN/1.73
GLOBULIN UR ELPH-MCNC: 2.8 GM/DL
GLUCOSE SERPL-MCNC: 144 MG/DL (ref 65–99)
HBA1C MFR BLD: 7.2 % (ref 3.5–5.6)
HCT VFR BLD AUTO: 41.1 % (ref 37.5–51)
HDLC SERPL-MCNC: 54 MG/DL (ref 40–60)
HGB BLD-MCNC: 13.9 G/DL (ref 13–17.7)
LDLC SERPL CALC-MCNC: 123 MG/DL (ref 0–100)
LDLC/HDLC SERPL: 2.28 {RATIO}
MCH RBC QN AUTO: 29.3 PG (ref 26.6–33)
MCHC RBC AUTO-ENTMCNC: 33.8 G/DL (ref 31.5–35.7)
MCV RBC AUTO: 86.5 FL (ref 79–97)
PLATELET # BLD AUTO: 203 10*3/MM3 (ref 140–450)
PMV BLD AUTO: 11.7 FL (ref 6–12)
POTASSIUM SERPL-SCNC: 4.1 MMOL/L (ref 3.5–5.2)
PROT SERPL-MCNC: 6.7 G/DL (ref 6–8.5)
RBC # BLD AUTO: 4.75 10*6/MM3 (ref 4.14–5.8)
SODIUM SERPL-SCNC: 139 MMOL/L (ref 136–145)
TRIGL SERPL-MCNC: 225 MG/DL (ref 0–150)
TSH SERPL DL<=0.05 MIU/L-ACNC: 10.7 UIU/ML (ref 0.27–4.2)
VLDLC SERPL-MCNC: 45 MG/DL (ref 5–40)
WBC # BLD AUTO: 5.67 10*3/MM3 (ref 3.4–10.8)

## 2020-08-18 ENCOUNTER — OFFICE VISIT (OUTPATIENT)
Dept: FAMILY MEDICINE CLINIC | Facility: CLINIC | Age: 45
End: 2020-08-18

## 2020-08-18 VITALS
HEART RATE: 61 BPM | OXYGEN SATURATION: 97 % | HEIGHT: 68 IN | WEIGHT: 191.8 LBS | SYSTOLIC BLOOD PRESSURE: 126 MMHG | BODY MASS INDEX: 29.07 KG/M2 | TEMPERATURE: 97.3 F | DIASTOLIC BLOOD PRESSURE: 79 MMHG

## 2020-08-18 DIAGNOSIS — E78.2 MULTIPLE-TYPE HYPERLIPIDEMIA: ICD-10-CM

## 2020-08-18 DIAGNOSIS — E11.65 TYPE 2 DIABETES MELLITUS WITH HYPERGLYCEMIA, WITHOUT LONG-TERM CURRENT USE OF INSULIN (HCC): Primary | ICD-10-CM

## 2020-08-18 DIAGNOSIS — E03.9 ACQUIRED HYPOTHYROIDISM: ICD-10-CM

## 2020-08-18 PROCEDURE — 99214 OFFICE O/P EST MOD 30 MIN: CPT | Performed by: NURSE PRACTITIONER

## 2020-08-18 RX ORDER — LEVOTHYROXINE SODIUM 0.2 MG/1
200 TABLET ORAL DAILY
Qty: 90 TABLET | Refills: 0 | Status: SHIPPED | OUTPATIENT
Start: 2020-08-18 | End: 2020-11-16

## 2020-08-18 NOTE — PROGRESS NOTES
Chief Complaint   Patient presents with   • Diabetes   • Hypothyroidism   • Results       HPI     Diabetes mellitus type II, feels stable on meds, denies any signs/symptoms of hyper/hypoglycemia, blurry vision, polydipsia, polyuria, nocturia, and unintentional weight loss, currently on metformin 500mg BID. Am bg running 150-170, mid day bg 130's.     Hypothyroidism, feels stable on medication, denies symptoms of constipation, weight gain/loss, hot or cold intolerance, hair loss, abnormal heart rate and fatigue.     Hyperlipidemia, The patient denies muscle aches, constipation, diarrhea, GI upset, fatigue, chest pain/pressure, exercise intolerance, dyspnea, palpitations, syncope and pedal edema.        The following portions of the patient's history were reviewed and updated as appropriate: allergies, current medications, past family history, past medical history, past social history, past surgical history and problem list.    Past Medical History:   Diagnosis Date   • History of echocardiogram 10/23/2019   • History of stress test 10/24/2019   • Hypothyroidism    • Syncope and collapse    • Type 2 diabetes mellitus (CMS/[facility])      Past Surgical History:   Procedure Laterality Date   • NO PAST SURGERIES       Family History   Problem Relation Age of Onset   • Diabetes Other    • Heart disease Mother    • Heart attack Father    • Stroke Father      Social History     Tobacco Use   • Smoking status: Former Smoker     Years: 20.00     Types: Cigarettes     Last attempt to quit: 2013     Years since quittin.6   • Smokeless tobacco: Never Used   • Tobacco comment: Patient states he smoked 1 pack per month   Substance Use Topics   • Alcohol use: No     Frequency: Never         Current Outpatient Medications:   •  clobetasol (TEMOVATE) 0.05 % ointment, Apply BID to affected area as needed., Disp: 30 g, Rfl: 1  •  glucose blood (FREESTYLE TEST STRIPS) test strip, Use to check blood glucose twice daily as needed DX:  "E11.65, Disp: 100 each, Rfl: 11  •  glucose monitoring kit (FREESTYLE) monitoring kit, Please provide glucometer on patient's formulary to check blood glucose twice daily. DX: E11.65, Disp: 1 each, Rfl: 0  •  Lancets (FREESTYLE) lancets, Use to check blood glucose twice daily as needed DX: E11.65, Disp: 100 each, Rfl: 11  •  levothyroxine (SYNTHROID, LEVOTHROID) 200 MCG tablet, Take 1 tablet by mouth Daily., Disp: 90 tablet, Rfl: 0  •  metFORMIN (GLUCOPHAGE) 1000 MG tablet, Take 1 tablet by mouth 2 (Two) Times a Day With Meals., Disp: 180 tablet, Rfl: 0      Review of Systems       Obtained as mentioned in HPI, otherwise negative.       Vitals:    08/18/20 0941   BP: 126/79   BP Location: Left arm   Patient Position: Sitting   Cuff Size: Adult   Pulse: 61   Temp: 97.3 °F (36.3 °C)   TempSrc: Skin   SpO2: 97%   Weight: 87 kg (191 lb 12.8 oz)   Height: 172.7 cm (67.99\")     Body mass index is 29.17 kg/m².    Physical Exam   Constitutional: He is oriented to person, place, and time. He appears well-developed and well-nourished. No distress.   HENT:   Head: Normocephalic and atraumatic.   Eyes: Pupils are equal, round, and reactive to light.   Neck: Normal range of motion. No thyromegaly present.   Cardiovascular: Normal rate, regular rhythm, normal heart sounds and intact distal pulses.   Pulmonary/Chest: Effort normal and breath sounds normal. No respiratory distress.   Abdominal: Soft. Bowel sounds are normal. He exhibits no distension. There is no tenderness.   Musculoskeletal: Normal range of motion.   Neurological: He is alert and oriented to person, place, and time.   Skin: Skin is warm and dry. He is not diaphoretic. No erythema.   Psychiatric: He has a normal mood and affect. His behavior is normal. Judgment and thought content normal.   Nursing note and vitals reviewed.      No visits with results within 7 Day(s) from this visit.   Latest known visit with results is:   Lab on 08/11/2020   Component Date Value " Ref Range Status   • WBC 08/11/2020 5.67  3.40 - 10.80 10*3/mm3 Final   • RBC 08/11/2020 4.75  4.14 - 5.80 10*6/mm3 Final   • Hemoglobin 08/11/2020 13.9  13.0 - 17.7 g/dL Final   • Hematocrit 08/11/2020 41.1  37.5 - 51.0 % Final   • MCV 08/11/2020 86.5  79.0 - 97.0 fL Final   • MCH 08/11/2020 29.3  26.6 - 33.0 pg Final   • MCHC 08/11/2020 33.8  31.5 - 35.7 g/dL Final   • RDW 08/11/2020 13.9  12.3 - 15.4 % Final   • RDW-SD 08/11/2020 43.8  37.0 - 54.0 fl Final   • MPV 08/11/2020 11.7  6.0 - 12.0 fL Final   • Platelets 08/11/2020 203  140 - 450 10*3/mm3 Final   • Glucose 08/11/2020 144* 65 - 99 mg/dL Final   • BUN 08/11/2020 6  6 - 20 mg/dL Final   • Creatinine 08/11/2020 0.63* 0.76 - 1.27 mg/dL Final   • Sodium 08/11/2020 139  136 - 145 mmol/L Final   • Potassium 08/11/2020 4.1  3.5 - 5.2 mmol/L Final   • Chloride 08/11/2020 105  98 - 107 mmol/L Final   • CO2 08/11/2020 21.8* 22.0 - 29.0 mmol/L Final   • Calcium 08/11/2020 8.6  8.6 - 10.5 mg/dL Final   • Total Protein 08/11/2020 6.7  6.0 - 8.5 g/dL Final   • Albumin 08/11/2020 3.90  3.50 - 5.20 g/dL Final   • ALT (SGPT) 08/11/2020 31  1 - 41 U/L Final   • AST (SGOT) 08/11/2020 23  1 - 40 U/L Final   • Alkaline Phosphatase 08/11/2020 37* 39 - 117 U/L Final   • Total Bilirubin 08/11/2020 0.8  0.0 - 1.2 mg/dL Final   • eGFR Non African Amer 08/11/2020 138  >60 mL/min/1.73 Final   • eGFR   Amer 08/11/2020 >150  >60 mL/min/1.73 Final   • Globulin 08/11/2020 2.8  gm/dL Final   • A/G Ratio 08/11/2020 1.4  g/dL Final   • BUN/Creatinine Ratio 08/11/2020 9.5  7.0 - 25.0 Final   • Anion Gap 08/11/2020 12.2  5.0 - 15.0 mmol/L Final   • Total Cholesterol 08/11/2020 222* 0 - 200 mg/dL Final   • Triglycerides 08/11/2020 225* 0 - 150 mg/dL Final   • HDL Cholesterol 08/11/2020 54  40 - 60 mg/dL Final   • LDL Cholesterol  08/11/2020 123* 0 - 100 mg/dL Final   • VLDL Cholesterol 08/11/2020 45* 5 - 40 mg/dL Final   • LDL/HDL Ratio 08/11/2020 2.28   Final   • Hemoglobin A1C  08/11/2020 7.2* 3.5 - 5.6 % Final   • TSH 08/11/2020 10.700* 0.270 - 4.200 uIU/mL Final       Diagnoses and all orders for this visit:    1. Type 2 diabetes mellitus with hyperglycemia, without long-term current use of insulin (CMS/[facility]) (Primary)    2. Multiple-type hyperlipidemia    3. Acquired hypothyroidism    Other orders  -     metFORMIN (GLUCOPHAGE) 1000 MG tablet; Take 1 tablet by mouth 2 (Two) Times a Day With Meals.  Dispense: 180 tablet; Refill: 0  -     levothyroxine (SYNTHROID, LEVOTHROID) 200 MCG tablet; Take 1 tablet by mouth Daily.  Dispense: 90 tablet; Refill: 0    reviewed labs  hgba1c up, inc metformin to 1000 BID, improve dm diet, lower carbs, sweets/sugars  TSH hypo, inc levo to 200mcg daily  Lipids elev, discussed healthy heart diet, limiting fatty, fried, greasy foods and increasing exercise habits  Repeat DM panel, TSH prior to 3mo f/u appt  rtc earlier prn

## 2020-09-24 ENCOUNTER — TRANSCRIBE ORDERS (OUTPATIENT)
Dept: ADMINISTRATIVE | Facility: HOSPITAL | Age: 45
End: 2020-09-24

## 2020-09-24 ENCOUNTER — LAB (OUTPATIENT)
Dept: LAB | Facility: HOSPITAL | Age: 45
End: 2020-09-24

## 2020-09-24 ENCOUNTER — HOSPITAL ENCOUNTER (OUTPATIENT)
Dept: CARDIOLOGY | Facility: HOSPITAL | Age: 45
Discharge: HOME | End: 2020-09-24

## 2020-09-24 DIAGNOSIS — Z01.818 PREOPERATIVE CLEARANCE: Primary | ICD-10-CM

## 2020-09-24 DIAGNOSIS — Z01.818 PREOPERATIVE CLEARANCE: ICD-10-CM

## 2020-09-24 DIAGNOSIS — M25.511 RIGHT SHOULDER PAIN, UNSPECIFIED CHRONICITY: ICD-10-CM

## 2020-09-24 LAB
ALBUMIN SERPL-MCNC: 4.3 G/DL (ref 3.5–5.2)
ALBUMIN/GLOB SERPL: 1.5 G/DL
ALP SERPL-CCNC: 40 U/L (ref 39–117)
ALT SERPL W P-5'-P-CCNC: 26 U/L (ref 1–41)
ANION GAP SERPL CALCULATED.3IONS-SCNC: 13.2 MMOL/L (ref 5–15)
AST SERPL-CCNC: 18 U/L (ref 1–40)
BILIRUB SERPL-MCNC: 1 MG/DL (ref 0–1.2)
BUN SERPL-MCNC: 9 MG/DL (ref 6–20)
BUN/CREAT SERPL: 12.5 (ref 7–25)
CALCIUM SPEC-SCNC: 9.2 MG/DL (ref 8.6–10.5)
CHLORIDE SERPL-SCNC: 100 MMOL/L (ref 98–107)
CO2 SERPL-SCNC: 25.8 MMOL/L (ref 22–29)
CREAT SERPL-MCNC: 0.72 MG/DL (ref 0.76–1.27)
DEPRECATED RDW RBC AUTO: 38.4 FL (ref 37–54)
ERYTHROCYTE [DISTWIDTH] IN BLOOD BY AUTOMATED COUNT: 12.7 % (ref 12.3–15.4)
GFR SERPL CREATININE-BSD FRML MDRD: 119 ML/MIN/1.73
GFR SERPL CREATININE-BSD FRML MDRD: 144 ML/MIN/1.73
GLOBULIN UR ELPH-MCNC: 2.9 GM/DL
GLUCOSE SERPL-MCNC: 107 MG/DL (ref 65–99)
HCT VFR BLD AUTO: 40.7 % (ref 37.5–51)
HGB BLD-MCNC: 13.9 G/DL (ref 13–17.7)
MCH RBC QN AUTO: 28.8 PG (ref 26.6–33)
MCHC RBC AUTO-ENTMCNC: 34.2 G/DL (ref 31.5–35.7)
MCV RBC AUTO: 84.3 FL (ref 79–97)
PLATELET # BLD AUTO: 206 10*3/MM3 (ref 140–450)
PMV BLD AUTO: 11.2 FL (ref 6–12)
POTASSIUM SERPL-SCNC: 4.1 MMOL/L (ref 3.5–5.2)
PROT SERPL-MCNC: 7.2 G/DL (ref 6–8.5)
RBC # BLD AUTO: 4.83 10*6/MM3 (ref 4.14–5.8)
SODIUM SERPL-SCNC: 139 MMOL/L (ref 136–145)
WBC # BLD AUTO: 7.92 10*3/MM3 (ref 3.4–10.8)

## 2020-09-24 PROCEDURE — 80053 COMPREHEN METABOLIC PANEL: CPT

## 2020-09-24 PROCEDURE — 93005 ELECTROCARDIOGRAM TRACING: CPT | Performed by: ORTHOPAEDIC SURGERY

## 2020-09-24 PROCEDURE — 85027 COMPLETE CBC AUTOMATED: CPT

## 2020-09-24 PROCEDURE — 36415 COLL VENOUS BLD VENIPUNCTURE: CPT

## 2020-09-24 PROCEDURE — 93010 ELECTROCARDIOGRAM REPORT: CPT | Performed by: INTERNAL MEDICINE

## 2020-11-11 ENCOUNTER — LAB (OUTPATIENT)
Dept: FAMILY MEDICINE CLINIC | Facility: CLINIC | Age: 45
End: 2020-11-11

## 2020-11-11 DIAGNOSIS — Z00.00 ENCOUNTER FOR WELLNESS EXAMINATION: ICD-10-CM

## 2020-11-11 DIAGNOSIS — E11.65 TYPE 2 DIABETES MELLITUS WITH HYPERGLYCEMIA, WITHOUT LONG-TERM CURRENT USE OF INSULIN (HCC): Primary | ICD-10-CM

## 2020-11-11 DIAGNOSIS — E03.9 ACQUIRED HYPOTHYROIDISM: ICD-10-CM

## 2020-11-11 LAB — HBA1C MFR BLD: 6.6 % (ref 3.5–5.6)

## 2020-11-11 PROCEDURE — 80061 LIPID PANEL: CPT | Performed by: NURSE PRACTITIONER

## 2020-11-11 PROCEDURE — 84443 ASSAY THYROID STIM HORMONE: CPT | Performed by: NURSE PRACTITIONER

## 2020-11-11 PROCEDURE — 86803 HEPATITIS C AB TEST: CPT | Performed by: NURSE PRACTITIONER

## 2020-11-11 PROCEDURE — 80053 COMPREHEN METABOLIC PANEL: CPT | Performed by: NURSE PRACTITIONER

## 2020-11-11 PROCEDURE — 83036 HEMOGLOBIN GLYCOSYLATED A1C: CPT | Performed by: NURSE PRACTITIONER

## 2020-11-11 PROCEDURE — 82043 UR ALBUMIN QUANTITATIVE: CPT | Performed by: NURSE PRACTITIONER

## 2020-11-11 PROCEDURE — 36415 COLL VENOUS BLD VENIPUNCTURE: CPT | Performed by: NURSE PRACTITIONER

## 2020-11-11 PROCEDURE — 85027 COMPLETE CBC AUTOMATED: CPT | Performed by: NURSE PRACTITIONER

## 2020-11-12 LAB
ALBUMIN SERPL-MCNC: 4.4 G/DL (ref 3.5–5.2)
ALBUMIN UR-MCNC: <1.2 MG/DL
ALBUMIN/GLOB SERPL: 1.9 G/DL
ALP SERPL-CCNC: 39 U/L (ref 39–117)
ALT SERPL W P-5'-P-CCNC: 27 U/L (ref 1–41)
ANION GAP SERPL CALCULATED.3IONS-SCNC: 9 MMOL/L (ref 5–15)
AST SERPL-CCNC: 23 U/L (ref 1–40)
BILIRUB SERPL-MCNC: 1 MG/DL (ref 0–1.2)
BUN SERPL-MCNC: 8 MG/DL (ref 6–20)
BUN/CREAT SERPL: 11.4 (ref 7–25)
CALCIUM SPEC-SCNC: 8.5 MG/DL (ref 8.6–10.5)
CHLORIDE SERPL-SCNC: 102 MMOL/L (ref 98–107)
CHOLEST SERPL-MCNC: 178 MG/DL (ref 0–200)
CO2 SERPL-SCNC: 24 MMOL/L (ref 22–29)
CREAT SERPL-MCNC: 0.7 MG/DL (ref 0.76–1.27)
DEPRECATED RDW RBC AUTO: 39.3 FL (ref 37–54)
ERYTHROCYTE [DISTWIDTH] IN BLOOD BY AUTOMATED COUNT: 12.2 % (ref 12.3–15.4)
GFR SERPL CREATININE-BSD FRML MDRD: 123 ML/MIN/1.73
GFR SERPL CREATININE-BSD FRML MDRD: 148 ML/MIN/1.73
GLOBULIN UR ELPH-MCNC: 2.3 GM/DL
GLUCOSE SERPL-MCNC: 133 MG/DL (ref 65–99)
HCT VFR BLD AUTO: 41.6 % (ref 37.5–51)
HCV AB SER DONR QL: NORMAL
HDLC SERPL-MCNC: 57 MG/DL (ref 40–60)
HGB BLD-MCNC: 13.8 G/DL (ref 13–17.7)
LDLC SERPL CALC-MCNC: 97 MG/DL (ref 0–100)
LDLC/HDLC SERPL: 1.64 {RATIO}
MCH RBC QN AUTO: 28.9 PG (ref 26.6–33)
MCHC RBC AUTO-ENTMCNC: 33.2 G/DL (ref 31.5–35.7)
MCV RBC AUTO: 87 FL (ref 79–97)
PLATELET # BLD AUTO: 212 10*3/MM3 (ref 140–450)
PMV BLD AUTO: 11.4 FL (ref 6–12)
POTASSIUM SERPL-SCNC: 4 MMOL/L (ref 3.5–5.2)
PROT SERPL-MCNC: 6.7 G/DL (ref 6–8.5)
RBC # BLD AUTO: 4.78 10*6/MM3 (ref 4.14–5.8)
SODIUM SERPL-SCNC: 135 MMOL/L (ref 136–145)
TRIGL SERPL-MCNC: 138 MG/DL (ref 0–150)
TSH SERPL DL<=0.05 MIU/L-ACNC: 0.05 UIU/ML (ref 0.27–4.2)
VLDLC SERPL-MCNC: 24 MG/DL (ref 5–40)
WBC # BLD AUTO: 5.4 10*3/MM3 (ref 3.4–10.8)

## 2020-11-16 RX ORDER — LEVOTHYROXINE SODIUM 0.15 MG/1
150 TABLET ORAL DAILY
Qty: 90 TABLET | Refills: 0 | Status: SHIPPED | OUTPATIENT
Start: 2020-11-16 | End: 2021-02-12

## 2020-11-16 NOTE — TELEPHONE ENCOUNTER
LOV 8/18/20  Next OV 11/18/20    Last refill 8/18/20 both medications 90 day supply with no refills.

## 2020-11-18 ENCOUNTER — OFFICE VISIT (OUTPATIENT)
Dept: FAMILY MEDICINE CLINIC | Facility: CLINIC | Age: 45
End: 2020-11-18

## 2020-11-18 VITALS
BODY MASS INDEX: 28.76 KG/M2 | OXYGEN SATURATION: 98 % | HEIGHT: 68 IN | HEART RATE: 80 BPM | WEIGHT: 189.8 LBS | DIASTOLIC BLOOD PRESSURE: 76 MMHG | TEMPERATURE: 96.9 F | SYSTOLIC BLOOD PRESSURE: 114 MMHG

## 2020-11-18 DIAGNOSIS — E78.5 HYPERLIPIDEMIA, UNSPECIFIED HYPERLIPIDEMIA TYPE: ICD-10-CM

## 2020-11-18 DIAGNOSIS — R06.83 SNORING: ICD-10-CM

## 2020-11-18 DIAGNOSIS — M25.511 RIGHT SHOULDER PAIN, UNSPECIFIED CHRONICITY: ICD-10-CM

## 2020-11-18 DIAGNOSIS — E03.9 ACQUIRED HYPOTHYROIDISM: Primary | ICD-10-CM

## 2020-11-18 DIAGNOSIS — Z23 FLU VACCINE NEED: ICD-10-CM

## 2020-11-18 DIAGNOSIS — E11.65 TYPE 2 DIABETES MELLITUS WITH HYPERGLYCEMIA, WITHOUT LONG-TERM CURRENT USE OF INSULIN (HCC): ICD-10-CM

## 2020-11-18 PROCEDURE — 90686 IIV4 VACC NO PRSV 0.5 ML IM: CPT | Performed by: NURSE PRACTITIONER

## 2020-11-18 PROCEDURE — 99214 OFFICE O/P EST MOD 30 MIN: CPT | Performed by: NURSE PRACTITIONER

## 2020-11-18 PROCEDURE — 90471 IMMUNIZATION ADMIN: CPT | Performed by: NURSE PRACTITIONER

## 2020-11-18 NOTE — PROGRESS NOTES
Chief Complaint   Patient presents with   • Diabetes   • Hyperlipidemia   • Hypothyroidism   • Follow-up     3 mo   • Immunizations     would like flu vax today   • Results     recent blood work.       HPI     Diabetes mellitus type II, feels stable on meds, denies any signs/symptoms of hyper/hypoglycemia, blurry vision, polydipsia, polyuria, nocturia, and unintentional weight loss.    Hypothyroidism, has not picked up new dose yet, feels stable on meds, denies symptoms of constipation, weight gain/loss, hot or cold intolerance, hair loss, abnormal heart rate and fatigue.     Hyperlipidemia, The patient denies muscle aches, constipation, diarrhea, GI upset, fatigue, chest pain/pressure, exercise intolerance, dyspnea, palpitations, syncope and pedal edema.      Right shoulder pain, saw Dr Arguello for steroid injections three month ago.  Pain returned and he had an arthroscopic procedure at [facility] Oct. 2. Now doing PT with [facility] in [place].  Next follow up with ortho Dec 8.    Snoring, had a CPAP machine about 7 years ago. Pt spouse states he snores frequently, no excessive daytime sleepiness, no witnessed apneas.  Feels rested mostly upon waking, sleeps around 5-6 hours sometimes but can go back to sleep.        The following portions of the patient's history were reviewed and updated as appropriate: allergies, current medications, past family history, past medical history, past social history, past surgical history and problem list.    Past Medical History:   Diagnosis Date   • History of echocardiogram 10/23/2019   • History of stress test 10/24/2019   • Hypothyroidism    • Syncope and collapse    • Type 2 diabetes mellitus (CMS/[facility])      Past Surgical History:   Procedure Laterality Date   • NO PAST SURGERIES       Family History   Problem Relation Age of Onset   • Diabetes Other    • Heart disease Mother    • Heart attack Father    • Stroke Father      Social History     Tobacco Use   • Smoking status:  "Former Smoker     Years: 20.00     Types: Cigarettes     Quit date:      Years since quittin.8   • Smokeless tobacco: Never Used   • Tobacco comment: Patient states he smoked 1 pack per month   Substance Use Topics   • Alcohol use: No     Frequency: Never         Current Outpatient Medications:   •  clobetasol (TEMOVATE) 0.05 % ointment, Apply BID to affected area as needed., Disp: 30 g, Rfl: 1  •  glucose blood (FREESTYLE TEST STRIPS) test strip, Use to check blood glucose twice daily as needed DX: E11.65, Disp: 100 each, Rfl: 11  •  glucose monitoring kit (FREESTYLE) monitoring kit, Please provide glucometer on patient's formulary to check blood glucose twice daily. DX: E11.65, Disp: 1 each, Rfl: 0  •  Lancets (FREESTYLE) lancets, Use to check blood glucose twice daily as needed DX: E11.65, Disp: 100 each, Rfl: 11  •  levothyroxine (SYNTHROID, LEVOTHROID) 150 MCG tablet, Take 1 tablet by mouth Daily., Disp: 90 tablet, Rfl: 0  •  metFORMIN (GLUCOPHAGE) 1000 MG tablet, TAKE 1 TABLET BY MOUTH TWICE DAILY WITH MEALS, Disp: 180 tablet, Rfl: 1      Review of Systems   Constitutional: Negative for appetite change, fatigue and fever.   HENT: Negative for congestion.    Eyes: Negative for blurred vision.   Respiratory: Negative for apnea and shortness of breath.    Cardiovascular: Negative for chest pain, palpitations and leg swelling.   Musculoskeletal: Positive for arthralgias ( right shoulder).   Neurological: Negative for headache.   Psychiatric/Behavioral: Positive for sleep disturbance ( painful right shoulder).       Vitals:    20 0850   BP: 114/76   BP Location: Left arm   Patient Position: Sitting   Cuff Size: Adult   Pulse: 80   Temp: 96.9 °F (36.1 °C)   TempSrc: Skin   SpO2: 98%   Weight: 86.1 kg (189 lb 12.8 oz)   Height: 172.7 cm (67.99\")     Body mass index is 28.87 kg/m².    Physical Exam  Vitals signs reviewed.   Constitutional:       Appearance: Normal appearance.   HENT:      Head: " Normocephalic and atraumatic.   Cardiovascular:      Rate and Rhythm: Normal rate and regular rhythm.      Heart sounds: Normal heart sounds.   Pulmonary:      Effort: Pulmonary effort is normal.      Breath sounds: Normal breath sounds. No wheezing.   Musculoskeletal:         General: Tenderness ( right shoulder-recent surgery) present.   Skin:     General: Skin is warm and dry.   Neurological:      General: No focal deficit present.      Mental Status: He is alert and oriented to person, place, and time. Mental status is at baseline.   Psychiatric:         Mood and Affect: Mood normal.         Behavior: Behavior normal.         Judgment: Judgment normal.         No visits with results within 7 Day(s) from this visit.   Latest known visit with results is:   Lab on 11/11/2020   Component Date Value Ref Range Status   • WBC 11/11/2020 5.40  3.40 - 10.80 10*3/mm3 Final   • RBC 11/11/2020 4.78  4.14 - 5.80 10*6/mm3 Final   • Hemoglobin 11/11/2020 13.8  13.0 - 17.7 g/dL Final   • Hematocrit 11/11/2020 41.6  37.5 - 51.0 % Final   • MCV 11/11/2020 87.0  79.0 - 97.0 fL Final   • MCH 11/11/2020 28.9  26.6 - 33.0 pg Final   • MCHC 11/11/2020 33.2  31.5 - 35.7 g/dL Final   • RDW 11/11/2020 12.2* 12.3 - 15.4 % Final   • RDW-SD 11/11/2020 39.3  37.0 - 54.0 fl Final   • MPV 11/11/2020 11.4  6.0 - 12.0 fL Final   • Platelets 11/11/2020 212  140 - 450 10*3/mm3 Final   • Glucose 11/11/2020 133* 65 - 99 mg/dL Final   • BUN 11/11/2020 8  6 - 20 mg/dL Final   • Creatinine 11/11/2020 0.70* 0.76 - 1.27 mg/dL Final   • Sodium 11/11/2020 135* 136 - 145 mmol/L Final   • Potassium 11/11/2020 4.0  3.5 - 5.2 mmol/L Final   • Chloride 11/11/2020 102  98 - 107 mmol/L Final   • CO2 11/11/2020 24.0  22.0 - 29.0 mmol/L Final   • Calcium 11/11/2020 8.5* 8.6 - 10.5 mg/dL Final   • Total Protein 11/11/2020 6.7  6.0 - 8.5 g/dL Final   • Albumin 11/11/2020 4.40  3.50 - 5.20 g/dL Final   • ALT (SGPT) 11/11/2020 27  1 - 41 U/L Final   • AST (SGOT)  11/11/2020 23  1 - 40 U/L Final   • Alkaline Phosphatase 11/11/2020 39  39 - 117 U/L Final   • Total Bilirubin 11/11/2020 1.0  0.0 - 1.2 mg/dL Final   • eGFR Non African Amer 11/11/2020 123  >60 mL/min/1.73 Final   • eGFR  African Amer 11/11/2020 148  >60 mL/min/1.73 Final   • Globulin 11/11/2020 2.3  gm/dL Final   • A/G Ratio 11/11/2020 1.9  g/dL Final   • BUN/Creatinine Ratio 11/11/2020 11.4  7.0 - 25.0 Final   • Anion Gap 11/11/2020 9.0  5.0 - 15.0 mmol/L Final   • Hemoglobin A1C 11/11/2020 6.6* 3.5 - 5.6 % Final   • Total Cholesterol 11/11/2020 178  0 - 200 mg/dL Final   • Triglycerides 11/11/2020 138  0 - 150 mg/dL Final   • HDL Cholesterol 11/11/2020 57  40 - 60 mg/dL Final   • LDL Cholesterol  11/11/2020 97  0 - 100 mg/dL Final   • VLDL Cholesterol 11/11/2020 24  5 - 40 mg/dL Final   • LDL/HDL Ratio 11/11/2020 1.64   Final   • TSH 11/11/2020 0.050* 0.270 - 4.200 uIU/mL Final   • Hepatitis C Ab 11/11/2020 Non-Reactive  Non-Reactive Final   • Microalbumin, Urine 11/11/2020 <1.2  mg/dL Final       Diagnoses and all orders for this visit:    1. Acquired hypothyroidism (Primary)  Comments:  last TSH 0.050, significantly decreased from previous, reduce levo to 150 mcg daily, recheck TSH in 6 weeks  Orders:  -     TSH; Future  -     TSH; Future    2. Type 2 diabetes mellitus with hyperglycemia, without long-term current use of insulin (CMS/HCC)  Comments:  A1C 6.6, increased Metformin to 1000 BID, reduce sugar and carb intake, repeat diabetic panel in 3 months  Orders:  -     Comprehensive Metabolic Panel; Future  -     CBC (No Diff); Future  -     Hemoglobin A1c; Future  -     Lipid Panel; Future    3. Hyperlipidemia, unspecified hyperlipidemia type  Comments:  Lipids now in good range, recheck lipid panel in 3 months    4. Right shoulder pain, unspecified chronicity  Comments:  Keep follow up with Dr. Arguello    5. Flu vaccine need    6. Snoring  Comments:  No excessive daytime sleepiness. Have spouse monitor for  episodes of apnea, report back if witnessed, consider sleep study    Other orders  -     Fluarix/Fluzone/Afluria Quad>6 Months     Reviewed labs today, medication changes reviewed with patient.     Return in about 3 months (around 2/18/2021) for Diabetes, thyroid.  Check TSH in 6 weeks, and diabetic panel/TSH in 3mo.    Documentation by Dione Brooks NP student, reviewed, edited and approved by me, GUSTABO Schneider

## 2021-02-12 RX ORDER — LEVOTHYROXINE SODIUM 0.15 MG/1
150 TABLET ORAL DAILY
Qty: 90 TABLET | Refills: 0 | Status: SHIPPED | OUTPATIENT
Start: 2021-02-12 | End: 2021-05-13

## 2021-02-24 ENCOUNTER — OFFICE VISIT (OUTPATIENT)
Dept: FAMILY MEDICINE CLINIC | Facility: CLINIC | Age: 46
End: 2021-02-24

## 2021-02-24 VITALS
DIASTOLIC BLOOD PRESSURE: 75 MMHG | HEIGHT: 68 IN | TEMPERATURE: 97.1 F | SYSTOLIC BLOOD PRESSURE: 115 MMHG | WEIGHT: 194.6 LBS | BODY MASS INDEX: 29.49 KG/M2 | HEART RATE: 62 BPM | OXYGEN SATURATION: 98 %

## 2021-02-24 DIAGNOSIS — E78.5 HYPERLIPIDEMIA, UNSPECIFIED HYPERLIPIDEMIA TYPE: Primary | ICD-10-CM

## 2021-02-24 DIAGNOSIS — E11.65 TYPE 2 DIABETES MELLITUS WITH HYPERGLYCEMIA, WITHOUT LONG-TERM CURRENT USE OF INSULIN (HCC): ICD-10-CM

## 2021-02-24 DIAGNOSIS — E03.9 ACQUIRED HYPOTHYROIDISM: ICD-10-CM

## 2021-02-24 LAB
DEPRECATED RDW RBC AUTO: 41.6 FL (ref 37–54)
ERYTHROCYTE [DISTWIDTH] IN BLOOD BY AUTOMATED COUNT: 13.1 % (ref 12.3–15.4)
HBA1C MFR BLD: 7.2 % (ref 3.5–5.6)
HCT VFR BLD AUTO: 42.7 % (ref 37.5–51)
HGB BLD-MCNC: 14.3 G/DL (ref 13–17.7)
MCH RBC QN AUTO: 29.4 PG (ref 26.6–33)
MCHC RBC AUTO-ENTMCNC: 33.5 G/DL (ref 31.5–35.7)
MCV RBC AUTO: 87.7 FL (ref 79–97)
PLATELET # BLD AUTO: 215 10*3/MM3 (ref 140–450)
PMV BLD AUTO: 11.6 FL (ref 6–12)
RBC # BLD AUTO: 4.87 10*6/MM3 (ref 4.14–5.8)
WBC # BLD AUTO: 7.07 10*3/MM3 (ref 3.4–10.8)

## 2021-02-24 PROCEDURE — 80053 COMPREHEN METABOLIC PANEL: CPT | Performed by: NURSE PRACTITIONER

## 2021-02-24 PROCEDURE — 83036 HEMOGLOBIN GLYCOSYLATED A1C: CPT | Performed by: NURSE PRACTITIONER

## 2021-02-24 PROCEDURE — 84443 ASSAY THYROID STIM HORMONE: CPT | Performed by: NURSE PRACTITIONER

## 2021-02-24 PROCEDURE — 99213 OFFICE O/P EST LOW 20 MIN: CPT | Performed by: NURSE PRACTITIONER

## 2021-02-24 PROCEDURE — 80061 LIPID PANEL: CPT | Performed by: NURSE PRACTITIONER

## 2021-02-24 PROCEDURE — 85027 COMPLETE CBC AUTOMATED: CPT | Performed by: NURSE PRACTITIONER

## 2021-02-24 PROCEDURE — 36415 COLL VENOUS BLD VENIPUNCTURE: CPT | Performed by: NURSE PRACTITIONER

## 2021-02-24 NOTE — PROGRESS NOTES
Chief Complaint  Hyperlipidemia, Hypothyroidism, Diabetes, and Follow-up (3 mo)    Subjective          Naveen Zapata presents to [facility] for   History of Present Illness     Diabetes mellitus type II, feels stable on meds, denies any signs/symptoms of hyper/hypoglycemia, blurry vision, polydipsia, polyuria, nocturia, and unintentional weight loss    Hyperlipidemia, The patient denies muscle aches, constipation, diarrhea, GI upset, fatigue, chest pain/pressure, exercise intolerance, dyspnea, palpitations, syncope and pedal edema.      Hypothyroidism, stable on medication, denies symptoms of constipation, weight gain/loss, hot or cold intolerance, hair loss, abnormal heart rate and fatigue.         The following portions of the patient's history were reviewed and updated as appropriate: allergies, current medications, past family history, past medical history, past social history, past surgical history and problem list.    Past Medical History:   Diagnosis Date   • History of echocardiogram 10/23/2019   • History of stress test 10/24/2019   • Hypothyroidism    • Syncope and collapse    • Type 2 diabetes mellitus (CMS/[facility])      Past Surgical History:   Procedure Laterality Date   • SHOULDER ARTHROSCOPY Right 10/02/2020     Family History   Problem Relation Age of Onset   • Diabetes Other    • Heart disease Mother    • Heart attack Father    • Stroke Father      Social History     Tobacco Use   • Smoking status: Former Smoker     Years: 20.00     Types: Cigarettes     Quit date:      Years since quittin.1   • Smokeless tobacco: Never Used   • Tobacco comment: Patient states he smoked 1 pack per month   Substance Use Topics   • Alcohol use: No     Frequency: Never       Current Outpatient Medications:   •  clobetasol (TEMOVATE) 0.05 % ointment, Apply BID to affected area as needed., Disp: 30 g, Rfl: 1  •  glucose blood (FREESTYLE TEST STRIPS) test strip, Use to check blood  "glucose twice daily as needed DX: E11.65, Disp: 100 each, Rfl: 11  •  glucose monitoring kit (FREESTYLE) monitoring kit, Please provide glucometer on patient's formulary to check blood glucose twice daily. DX: E11.65, Disp: 1 each, Rfl: 0  •  Lancets (FREESTYLE) lancets, Use to check blood glucose twice daily as needed DX: E11.65, Disp: 100 each, Rfl: 11  •  levothyroxine (SYNTHROID, LEVOTHROID) 150 MCG tablet, TAKE 1 TABLET BY MOUTH DAILY, Disp: 90 tablet, Rfl: 0  •  metFORMIN (GLUCOPHAGE) 1000 MG tablet, TAKE 1 TABLET BY MOUTH TWICE DAILY WITH MEALS, Disp: 180 tablet, Rfl: 1    Objective   Vital Signs:   /75 (BP Location: Left arm, Patient Position: Sitting, Cuff Size: Adult)   Pulse 62   Temp 97.1 °F (36.2 °C) (Infrared)   Ht 172.7 cm (67.99\")   Wt 88.3 kg (194 lb 9.6 oz)   SpO2 98%   BMI 29.60 kg/m²       Physical Exam  Vitals signs and nursing note reviewed.   Constitutional:       General: He is not in acute distress.     Appearance: He is well-developed. He is not diaphoretic.   HENT:      Head: Normocephalic and atraumatic.   Eyes:      Pupils: Pupils are equal, round, and reactive to light.   Neck:      Musculoskeletal: Normal range of motion.      Thyroid: No thyromegaly.   Cardiovascular:      Rate and Rhythm: Normal rate and regular rhythm.      Heart sounds: Normal heart sounds. No murmur.   Pulmonary:      Effort: Pulmonary effort is normal. No respiratory distress.      Breath sounds: Normal breath sounds.   Abdominal:      General: Bowel sounds are normal. There is no distension.      Palpations: Abdomen is soft.      Tenderness: There is no abdominal tenderness.   Musculoskeletal: Normal range of motion.   Skin:     General: Skin is warm and dry.      Findings: No erythema.   Neurological:      Mental Status: He is alert and oriented to person, place, and time.   Psychiatric:         Behavior: Behavior normal.         Thought Content: Thought content normal.         Judgment: Judgment " normal.          Result Review :     No visits with results within 7 Day(s) from this visit.   Latest known visit with results is:   Lab on 11/11/2020   Component Date Value Ref Range Status   • WBC 11/11/2020 5.40  3.40 - 10.80 10*3/mm3 Final   • RBC 11/11/2020 4.78  4.14 - 5.80 10*6/mm3 Final   • Hemoglobin 11/11/2020 13.8  13.0 - 17.7 g/dL Final   • Hematocrit 11/11/2020 41.6  37.5 - 51.0 % Final   • MCV 11/11/2020 87.0  79.0 - 97.0 fL Final   • MCH 11/11/2020 28.9  26.6 - 33.0 pg Final   • MCHC 11/11/2020 33.2  31.5 - 35.7 g/dL Final   • RDW 11/11/2020 12.2* 12.3 - 15.4 % Final   • RDW-SD 11/11/2020 39.3  37.0 - 54.0 fl Final   • MPV 11/11/2020 11.4  6.0 - 12.0 fL Final   • Platelets 11/11/2020 212  140 - 450 10*3/mm3 Final   • Glucose 11/11/2020 133* 65 - 99 mg/dL Final   • BUN 11/11/2020 8  6 - 20 mg/dL Final   • Creatinine 11/11/2020 0.70* 0.76 - 1.27 mg/dL Final   • Sodium 11/11/2020 135* 136 - 145 mmol/L Final   • Potassium 11/11/2020 4.0  3.5 - 5.2 mmol/L Final   • Chloride 11/11/2020 102  98 - 107 mmol/L Final   • CO2 11/11/2020 24.0  22.0 - 29.0 mmol/L Final   • Calcium 11/11/2020 8.5* 8.6 - 10.5 mg/dL Final   • Total Protein 11/11/2020 6.7  6.0 - 8.5 g/dL Final   • Albumin 11/11/2020 4.40  3.50 - 5.20 g/dL Final   • ALT (SGPT) 11/11/2020 27  1 - 41 U/L Final   • AST (SGOT) 11/11/2020 23  1 - 40 U/L Final   • Alkaline Phosphatase 11/11/2020 39  39 - 117 U/L Final   • Total Bilirubin 11/11/2020 1.0  0.0 - 1.2 mg/dL Final   • eGFR Non African Amer 11/11/2020 123  >60 mL/min/1.73 Final   • eGFR  African Amer 11/11/2020 148  >60 mL/min/1.73 Final   • Globulin 11/11/2020 2.3  gm/dL Final   • A/G Ratio 11/11/2020 1.9  g/dL Final   • BUN/Creatinine Ratio 11/11/2020 11.4  7.0 - 25.0 Final   • Anion Gap 11/11/2020 9.0  5.0 - 15.0 mmol/L Final   • Hemoglobin A1C 11/11/2020 6.6* 3.5 - 5.6 % Final   • Total Cholesterol 11/11/2020 178  0 - 200 mg/dL Final   • Triglycerides 11/11/2020 138  0 - 150 mg/dL Final   • HDL  Cholesterol 11/11/2020 57  40 - 60 mg/dL Final   • LDL Cholesterol  11/11/2020 97  0 - 100 mg/dL Final   • VLDL Cholesterol 11/11/2020 24  5 - 40 mg/dL Final   • LDL/HDL Ratio 11/11/2020 1.64   Final   • TSH 11/11/2020 0.050* 0.270 - 4.200 uIU/mL Final   • Hepatitis C Ab 11/11/2020 Non-Reactive  Non-Reactive Final   • Microalbumin, Urine 11/11/2020 <1.2  mg/dL Final                       Assessment and Plan    Diagnoses and all orders for this visit:    1. Hyperlipidemia, unspecified hyperlipidemia type (Primary)  Comments:  lipid panel today. cont HHD, exercise    2. Acquired hypothyroidism  Comments:  last TSH 0.050, currently on levo after reduction to 150 mcg daily, recheck TSH today  Orders:  -     TSH    3. Type 2 diabetes mellitus with hyperglycemia, without long-term current use of insulin (CMS/[facility])  Comments:  previous A1C 6.6, tolerating Metformin 1000 BID, cont to reduce sugar and carb intake, repeat diabetic panel today  Orders:  -     Lipid Panel  -     Hemoglobin A1c  -     CBC (No Diff)  -     Comprehensive Metabolic Panel      meds recently refilled, none needed at this time. Ins will cover 30 days. rf when needed.         I spent 15 minutes caring for Naveen on this date of service. This time includes time spent by me in the following activities:preparing for the visit, obtaining and/or reviewing a separately obtained history, counseling and educating the patient/family/caregiver, ordering medications, tests, or procedures and documenting information in the medical record    Follow Up   Return in about 6 months (around 8/24/2021) for DM, hypothyroidism, HLD.  Patient was given instructions and counseling regarding his condition or for health maintenance advice. Please see specific information pulled into the AVS if appropriate.

## 2021-02-25 LAB
ALBUMIN SERPL-MCNC: 4.3 G/DL (ref 3.5–5.2)
ALBUMIN/GLOB SERPL: 1.7 G/DL
ALP SERPL-CCNC: 41 U/L (ref 39–117)
ALT SERPL W P-5'-P-CCNC: 35 U/L (ref 1–41)
ANION GAP SERPL CALCULATED.3IONS-SCNC: 10.2 MMOL/L (ref 5–15)
AST SERPL-CCNC: 28 U/L (ref 1–40)
BILIRUB SERPL-MCNC: 0.8 MG/DL (ref 0–1.2)
BUN SERPL-MCNC: 8 MG/DL (ref 6–20)
BUN/CREAT SERPL: 10.8 (ref 7–25)
CALCIUM SPEC-SCNC: 8.9 MG/DL (ref 8.6–10.5)
CHLORIDE SERPL-SCNC: 99 MMOL/L (ref 98–107)
CHOLEST SERPL-MCNC: 189 MG/DL (ref 0–200)
CO2 SERPL-SCNC: 24.8 MMOL/L (ref 22–29)
CREAT SERPL-MCNC: 0.74 MG/DL (ref 0.76–1.27)
GFR SERPL CREATININE-BSD FRML MDRD: 115 ML/MIN/1.73
GFR SERPL CREATININE-BSD FRML MDRD: 139 ML/MIN/1.73
GLOBULIN UR ELPH-MCNC: 2.5 GM/DL
GLUCOSE SERPL-MCNC: 157 MG/DL (ref 65–99)
HDLC SERPL-MCNC: 52 MG/DL (ref 40–60)
LDLC SERPL CALC-MCNC: 104 MG/DL (ref 0–100)
LDLC/HDLC SERPL: 1.89 {RATIO}
POTASSIUM SERPL-SCNC: 4.2 MMOL/L (ref 3.5–5.2)
PROT SERPL-MCNC: 6.8 G/DL (ref 6–8.5)
SODIUM SERPL-SCNC: 134 MMOL/L (ref 136–145)
TRIGL SERPL-MCNC: 194 MG/DL (ref 0–150)
TSH SERPL DL<=0.05 MIU/L-ACNC: 3.93 UIU/ML (ref 0.27–4.2)
VLDLC SERPL-MCNC: 33 MG/DL (ref 5–40)

## 2021-05-13 RX ORDER — LEVOTHYROXINE SODIUM 0.15 MG/1
150 TABLET ORAL DAILY
Qty: 90 TABLET | Refills: 0 | Status: SHIPPED | OUTPATIENT
Start: 2021-05-13 | End: 2021-08-11

## 2021-08-11 RX ORDER — LEVOTHYROXINE SODIUM 0.15 MG/1
150 TABLET ORAL DAILY
Qty: 90 TABLET | Refills: 0 | Status: SHIPPED | OUTPATIENT
Start: 2021-08-11 | End: 2021-08-26 | Stop reason: SDUPTHER

## 2021-08-11 RX ORDER — EMPAGLIFLOZIN 25 MG/1
TABLET, FILM COATED ORAL
Qty: 90 TABLET | Refills: 1 | Status: SHIPPED | OUTPATIENT
Start: 2021-08-11 | End: 2022-02-24 | Stop reason: SDUPTHER

## 2021-08-26 ENCOUNTER — OFFICE VISIT (OUTPATIENT)
Dept: FAMILY MEDICINE CLINIC | Facility: CLINIC | Age: 46
End: 2021-08-26

## 2021-08-26 VITALS
WEIGHT: 178.6 LBS | HEIGHT: 68 IN | SYSTOLIC BLOOD PRESSURE: 115 MMHG | BODY MASS INDEX: 27.07 KG/M2 | OXYGEN SATURATION: 97 % | TEMPERATURE: 97.5 F | HEART RATE: 66 BPM | DIASTOLIC BLOOD PRESSURE: 70 MMHG

## 2021-08-26 DIAGNOSIS — E03.9 ACQUIRED HYPOTHYROIDISM: ICD-10-CM

## 2021-08-26 DIAGNOSIS — E11.65 TYPE 2 DIABETES MELLITUS WITH HYPERGLYCEMIA, WITHOUT LONG-TERM CURRENT USE OF INSULIN (HCC): Primary | ICD-10-CM

## 2021-08-26 DIAGNOSIS — E78.5 HYPERLIPIDEMIA, UNSPECIFIED HYPERLIPIDEMIA TYPE: ICD-10-CM

## 2021-08-26 DIAGNOSIS — Z12.11 COLON CANCER SCREENING: ICD-10-CM

## 2021-08-26 DIAGNOSIS — Z00.00 PREVENTATIVE HEALTH CARE: ICD-10-CM

## 2021-08-26 PROCEDURE — 36415 COLL VENOUS BLD VENIPUNCTURE: CPT | Performed by: NURSE PRACTITIONER

## 2021-08-26 PROCEDURE — 90471 IMMUNIZATION ADMIN: CPT | Performed by: NURSE PRACTITIONER

## 2021-08-26 PROCEDURE — 90732 PPSV23 VACC 2 YRS+ SUBQ/IM: CPT | Performed by: NURSE PRACTITIONER

## 2021-08-26 PROCEDURE — 83036 HEMOGLOBIN GLYCOSYLATED A1C: CPT | Performed by: NURSE PRACTITIONER

## 2021-08-26 PROCEDURE — 84443 ASSAY THYROID STIM HORMONE: CPT | Performed by: NURSE PRACTITIONER

## 2021-08-26 PROCEDURE — 99396 PREV VISIT EST AGE 40-64: CPT | Performed by: NURSE PRACTITIONER

## 2021-08-26 PROCEDURE — 80053 COMPREHEN METABOLIC PANEL: CPT | Performed by: NURSE PRACTITIONER

## 2021-08-26 PROCEDURE — 80061 LIPID PANEL: CPT | Performed by: NURSE PRACTITIONER

## 2021-08-26 PROCEDURE — 85027 COMPLETE CBC AUTOMATED: CPT | Performed by: NURSE PRACTITIONER

## 2021-08-26 RX ORDER — LEVOTHYROXINE SODIUM 0.15 MG/1
150 TABLET ORAL DAILY
Qty: 90 TABLET | Refills: 0 | Status: SHIPPED | OUTPATIENT
Start: 2021-08-26 | End: 2021-08-26

## 2021-08-26 RX ORDER — LEVOTHYROXINE SODIUM 0.15 MG/1
150 TABLET ORAL DAILY
Qty: 90 TABLET | Refills: 0 | Status: SHIPPED | OUTPATIENT
Start: 2021-08-26 | End: 2021-11-16

## 2021-08-26 NOTE — PROGRESS NOTES
Chief Complaint  Diabetes, Hyperlipidemia, Hypothyroidism, Annual Exam (6 mo), and Results    Subjective          Naveen Zapata presents to [facility] for   History of Present Illness     Patient is here for annual exam     Diabetes mellitus type II, feels stable on meds, denies any signs/symptoms of hyper/hypoglycemia, blurry vision, polydipsia, polyuria, nocturia, and unintentional weight loss    Hyperlipidemia, not on statin, following healthy heart and diabetic diet.  The patient denies muscle aches, constipation, diarrhea, GI upset, fatigue, chest pain/pressure, exercise intolerance, dyspnea, palpitations, syncope and pedal edema.      Hypothyroidism, stable on medication, denies symptoms of constipation, weight gain/loss, hot or cold intolerance, hair loss, abnormal heart rate and fatigue.     Patient received Pfizer Covid vaccines on Saturday,  and April 10       The following portions of the patient's history were reviewed and updated as appropriate: allergies, current medications, past family history, past medical history, past social history, past surgical history and problem list.    Past Medical History:   Diagnosis Date   • History of echocardiogram 10/23/2019   • History of stress test 10/24/2019   • Hypothyroidism    • Syncope and collapse    • Type 2 diabetes mellitus (CMS/[facility])      Past Surgical History:   Procedure Laterality Date   • SHOULDER ARTHROSCOPY Right 10/02/2020     Family History   Problem Relation Age of Onset   • Diabetes Other    • Heart disease Mother    • Heart attack Father    • Stroke Father      Social History     Tobacco Use   • Smoking status: Former Smoker     Packs/day: 0.60     Years: 20.00     Pack years: 12.00     Types: Cigarettes     Quit date:      Years since quittin.6   • Smokeless tobacco: Never Used   • Tobacco comment: Patient states he smoked 1 pack per month   Substance Use Topics   • Alcohol use: No       Current  "Outpatient Medications:   •  glucose blood (FREESTYLE TEST STRIPS) test strip, Use to check blood glucose twice daily as needed DX: E11.65, Disp: 100 each, Rfl: 11  •  glucose monitoring kit (FREESTYLE) monitoring kit, Please provide glucometer on patient's formulary to check blood glucose twice daily. DX: E11.65, Disp: 1 each, Rfl: 0  •  Jardiance 25 MG tablet tablet, TAKE 1 TABLET BY MOUTH DAILY, Disp: 90 tablet, Rfl: 1  •  Lancets (FREESTYLE) lancets, Use to check blood glucose twice daily as needed DX: E11.65, Disp: 100 each, Rfl: 11  •  levothyroxine (SYNTHROID, LEVOTHROID) 150 MCG tablet, Take 1 tablet by mouth Daily., Disp: 90 tablet, Rfl: 0  •  metFORMIN (GLUCOPHAGE) 1000 MG tablet, Take 1 tablet by mouth 2 (Two) Times a Day With Meals., Disp: 180 tablet, Rfl: 0    Objective   Vital Signs:   /70 (BP Location: Left arm, Patient Position: Sitting, Cuff Size: Adult)   Pulse 66   Temp 97.5 °F (36.4 °C) (Infrared)   Ht 172.7 cm (67.99\")   Wt 81 kg (178 lb 9.6 oz)   SpO2 97%   BMI 27.16 kg/m²       Physical Exam  Vitals and nursing note reviewed.   Constitutional:       General: He is not in acute distress.     Appearance: He is well-developed. He is not diaphoretic.   HENT:      Head: Normocephalic and atraumatic.   Eyes:      Pupils: Pupils are equal, round, and reactive to light.   Neck:      Thyroid: No thyromegaly.   Cardiovascular:      Rate and Rhythm: Normal rate and regular rhythm.      Pulses:           Dorsalis pedis pulses are 2+ on the right side and 2+ on the left side.        Posterior tibial pulses are 2+ on the right side and 2+ on the left side.      Heart sounds: Normal heart sounds. No murmur heard.     Pulmonary:      Effort: Pulmonary effort is normal. No respiratory distress.      Breath sounds: Normal breath sounds.   Abdominal:      General: Bowel sounds are normal. There is no distension.      Palpations: Abdomen is soft.      Tenderness: There is no abdominal tenderness. "   Musculoskeletal:         General: Normal range of motion.      Cervical back: Normal range of motion.      Right foot: Normal range of motion. No deformity or bunion.      Left foot: Normal range of motion. No deformity or bunion.   Feet:      Right foot:      Protective Sensation: 10 sites tested. 10 sites sensed.      Skin integrity: Callus and dry skin present. No ulcer, blister, skin breakdown, erythema, warmth or fissure.      Toenail Condition: Right toenails are normal.      Left foot:      Protective Sensation: 10 sites tested. 10 sites sensed.      Skin integrity: Callus (Mild, both heels) and dry skin present. No ulcer, blister, skin breakdown, erythema, warmth or fissure.      Toenail Condition: Left toenails are normal.      Comments: Diabetic Foot Exam Performed and Monofilament Test Performed    Skin:     General: Skin is warm and dry.      Findings: No erythema.   Neurological:      Mental Status: He is alert and oriented to person, place, and time.   Psychiatric:         Behavior: Behavior normal.         Thought Content: Thought content normal.         Judgment: Judgment normal.          Result Review :     No visits with results within 7 Day(s) from this visit.   Latest known visit with results is:   Office Visit on 02/24/2021   Component Date Value Ref Range Status   • TSH 02/24/2021 3.930  0.270 - 4.200 uIU/mL Final   • Total Cholesterol 02/24/2021 189  0 - 200 mg/dL Final   • Triglycerides 02/24/2021 194* 0 - 150 mg/dL Final   • HDL Cholesterol 02/24/2021 52  40 - 60 mg/dL Final   • LDL Cholesterol  02/24/2021 104* 0 - 100 mg/dL Final   • VLDL Cholesterol 02/24/2021 33  5 - 40 mg/dL Final   • LDL/HDL Ratio 02/24/2021 1.89   Final   • Hemoglobin A1C 02/24/2021 7.2* 3.5 - 5.6 % Final   • WBC 02/24/2021 7.07  3.40 - 10.80 10*3/mm3 Final   • RBC 02/24/2021 4.87  4.14 - 5.80 10*6/mm3 Final   • Hemoglobin 02/24/2021 14.3  13.0 - 17.7 g/dL Final   • Hematocrit 02/24/2021 42.7  37.5 - 51.0 % Final   •  MCV 02/24/2021 87.7  79.0 - 97.0 fL Final   • MCH 02/24/2021 29.4  26.6 - 33.0 pg Final   • MCHC 02/24/2021 33.5  31.5 - 35.7 g/dL Final   • RDW 02/24/2021 13.1  12.3 - 15.4 % Final   • RDW-SD 02/24/2021 41.6  37.0 - 54.0 fl Final   • MPV 02/24/2021 11.6  6.0 - 12.0 fL Final   • Platelets 02/24/2021 215  140 - 450 10*3/mm3 Final   • Glucose 02/24/2021 157* 65 - 99 mg/dL Final   • BUN 02/24/2021 8  6 - 20 mg/dL Final   • Creatinine 02/24/2021 0.74* 0.76 - 1.27 mg/dL Final   • Sodium 02/24/2021 134* 136 - 145 mmol/L Final   • Potassium 02/24/2021 4.2  3.5 - 5.2 mmol/L Final   • Chloride 02/24/2021 99  98 - 107 mmol/L Final   • CO2 02/24/2021 24.8  22.0 - 29.0 mmol/L Final   • Calcium 02/24/2021 8.9  8.6 - 10.5 mg/dL Final   • Total Protein 02/24/2021 6.8  6.0 - 8.5 g/dL Final   • Albumin 02/24/2021 4.30  3.50 - 5.20 g/dL Final   • ALT (SGPT) 02/24/2021 35  1 - 41 U/L Final   • AST (SGOT) 02/24/2021 28  1 - 40 U/L Final   • Alkaline Phosphatase 02/24/2021 41  39 - 117 U/L Final   • Total Bilirubin 02/24/2021 0.8  0.0 - 1.2 mg/dL Final   • eGFR Non  Amer 02/24/2021 115  >60 mL/min/1.73 Final   • eGFR   Amer 02/24/2021 139  >60 mL/min/1.73 Final   • Globulin 02/24/2021 2.5  gm/dL Final   • A/G Ratio 02/24/2021 1.7  g/dL Final   • BUN/Creatinine Ratio 02/24/2021 10.8  7.0 - 25.0 Final   • Anion Gap 02/24/2021 10.2  5.0 - 15.0 mmol/L Final                       Assessment and Plan    Diagnoses and all orders for this visit:    1. Type 2 diabetes mellitus with hyperglycemia, without long-term current use of insulin (CMS/[facility]) (Primary)  -     Comprehensive Metabolic Panel  -     CBC (No Diff)  -     Hemoglobin A1c  -     metFORMIN (GLUCOPHAGE) 1000 MG tablet; Take 1 tablet by mouth 2 (Two) Times a Day With Meals.  Dispense: 180 tablet; Refill: 0    2. Acquired hypothyroidism  -     TSH  -     levothyroxine (SYNTHROID, LEVOTHROID) 150 MCG tablet; Take 1 tablet by mouth Daily.  Dispense: 90 tablet; Refill:  0    3. Hyperlipidemia, unspecified hyperlipidemia type  -     Lipid Panel    4. Colon cancer screening  -     Cologuard - Stool, Per Rectum; Future    5. Preventative health care  -     Pneumococcal Polysaccharide Vaccine 23-Valent Greater Than or Equal To 1yo Subcutaneous / IM    1.  Overall doing well, refill medications above  2.  Continue Metformin and Jardiance for diabetes, check A1c today  3.  Continue and refill levothyroxine, TSH today  4.  Ordered Cologuard, will notify results  5.  Pneumo 23 today  6.  Covid vaccines up-to-date  7.  Labs as ordered above, will notify patient results      I spent 32 minutes caring for Naveen Zapata on this date of service. This time includes time spent by me in the following activities: preparing for the visit, reviewing tests, performing a medically appropriate examination and/or evaluation , counseling and educating the patient/family/caregiver, ordering medications, tests, or procedures and documenting information in the medical record        Follow Up     Return in about 6 months (around 2/26/2022) for DM, HTN, lipids. .  Patient was given instructions and counseling regarding his condition or for health maintenance advice. Please see specific information pulled into the AVS if appropriate.      EMR Dragon transcription disclaimer:  Some of this encounter note is an electronic transcription translation of spoken language to printed text. The electronic translation of spoken language may permit erroneous, or at times, nonsensical words or phrases to be inadvertently transcribed; Although I have reviewed the note for such errors some may still exist.

## 2021-08-27 LAB
ALBUMIN SERPL-MCNC: 4.5 G/DL (ref 3.5–5.2)
ALBUMIN/GLOB SERPL: 1.7 G/DL
ALP SERPL-CCNC: 39 U/L (ref 39–117)
ALT SERPL W P-5'-P-CCNC: 26 U/L (ref 1–41)
ANION GAP SERPL CALCULATED.3IONS-SCNC: 9.9 MMOL/L (ref 5–15)
AST SERPL-CCNC: 25 U/L (ref 1–40)
BILIRUB SERPL-MCNC: 1 MG/DL (ref 0–1.2)
BUN SERPL-MCNC: 12 MG/DL (ref 6–20)
BUN/CREAT SERPL: 13.8 (ref 7–25)
CALCIUM SPEC-SCNC: 8.6 MG/DL (ref 8.6–10.5)
CHLORIDE SERPL-SCNC: 100 MMOL/L (ref 98–107)
CHOLEST SERPL-MCNC: 223 MG/DL (ref 0–200)
CO2 SERPL-SCNC: 26.1 MMOL/L (ref 22–29)
CREAT SERPL-MCNC: 0.87 MG/DL (ref 0.76–1.27)
DEPRECATED RDW RBC AUTO: 44.9 FL (ref 37–54)
ERYTHROCYTE [DISTWIDTH] IN BLOOD BY AUTOMATED COUNT: 13.7 % (ref 12.3–15.4)
GFR SERPL CREATININE-BSD FRML MDRD: 115 ML/MIN/1.73
GFR SERPL CREATININE-BSD FRML MDRD: 95 ML/MIN/1.73
GLOBULIN UR ELPH-MCNC: 2.7 GM/DL
GLUCOSE SERPL-MCNC: 110 MG/DL (ref 65–99)
HBA1C MFR BLD: 6.3 % (ref 3.5–5.6)
HCT VFR BLD AUTO: 46.6 % (ref 37.5–51)
HDLC SERPL-MCNC: 63 MG/DL (ref 40–60)
HGB BLD-MCNC: 14.9 G/DL (ref 13–17.7)
LDLC SERPL CALC-MCNC: 130 MG/DL (ref 0–100)
LDLC/HDLC SERPL: 1.99 {RATIO}
MCH RBC QN AUTO: 28.6 PG (ref 26.6–33)
MCHC RBC AUTO-ENTMCNC: 32 G/DL (ref 31.5–35.7)
MCV RBC AUTO: 89.4 FL (ref 79–97)
PLATELET # BLD AUTO: 231 10*3/MM3 (ref 140–450)
PMV BLD AUTO: 11.5 FL (ref 6–12)
POTASSIUM SERPL-SCNC: 4.3 MMOL/L (ref 3.5–5.2)
PROT SERPL-MCNC: 7.2 G/DL (ref 6–8.5)
RBC # BLD AUTO: 5.21 10*6/MM3 (ref 4.14–5.8)
SODIUM SERPL-SCNC: 136 MMOL/L (ref 136–145)
TRIGL SERPL-MCNC: 172 MG/DL (ref 0–150)
TSH SERPL DL<=0.05 MIU/L-ACNC: 5.42 UIU/ML (ref 0.27–4.2)
VLDLC SERPL-MCNC: 30 MG/DL (ref 5–40)
WBC # BLD AUTO: 6.73 10*3/MM3 (ref 3.4–10.8)

## 2021-09-10 ENCOUNTER — TELEPHONE (OUTPATIENT)
Dept: FAMILY MEDICINE CLINIC | Facility: CLINIC | Age: 46
End: 2021-09-10

## 2021-10-28 DIAGNOSIS — E11.65 TYPE 2 DIABETES MELLITUS WITH HYPERGLYCEMIA, WITHOUT LONG-TERM CURRENT USE OF INSULIN (HCC): ICD-10-CM

## 2021-11-13 DIAGNOSIS — E03.9 ACQUIRED HYPOTHYROIDISM: ICD-10-CM

## 2021-11-16 RX ORDER — LEVOTHYROXINE SODIUM 0.15 MG/1
150 TABLET ORAL DAILY
Qty: 90 TABLET | Refills: 0 | Status: SHIPPED | OUTPATIENT
Start: 2021-11-16 | End: 2022-02-24 | Stop reason: SDUPTHER

## 2021-11-16 NOTE — TELEPHONE ENCOUNTER
Rx request   
[FreeTextEntry3] : I was present with the nurse practitioner during the history and exam of the patient. I discussed patient's management with the NP in detail. I reviewed the NP’s note and agree with the documented findings and plan of care. I would like to take this opportunity to thank you for involving me in this patient’s care. Please do not hesitate to contact me if you have any further questions at [phone].\par

## 2022-01-25 DIAGNOSIS — E11.65 TYPE 2 DIABETES MELLITUS WITH HYPERGLYCEMIA, WITHOUT LONG-TERM CURRENT USE OF INSULIN: ICD-10-CM

## 2022-02-24 ENCOUNTER — OFFICE VISIT (OUTPATIENT)
Dept: FAMILY MEDICINE CLINIC | Facility: CLINIC | Age: 47
End: 2022-02-24

## 2022-02-24 VITALS
OXYGEN SATURATION: 97 % | HEIGHT: 68 IN | HEART RATE: 64 BPM | WEIGHT: 177.4 LBS | SYSTOLIC BLOOD PRESSURE: 118 MMHG | BODY MASS INDEX: 26.89 KG/M2 | DIASTOLIC BLOOD PRESSURE: 79 MMHG

## 2022-02-24 DIAGNOSIS — E03.9 ACQUIRED HYPOTHYROIDISM: ICD-10-CM

## 2022-02-24 DIAGNOSIS — E78.5 HYPERLIPIDEMIA, UNSPECIFIED HYPERLIPIDEMIA TYPE: Primary | ICD-10-CM

## 2022-02-24 DIAGNOSIS — E11.65 TYPE 2 DIABETES MELLITUS WITH HYPERGLYCEMIA, WITHOUT LONG-TERM CURRENT USE OF INSULIN: ICD-10-CM

## 2022-02-24 PROCEDURE — 85027 COMPLETE CBC AUTOMATED: CPT | Performed by: NURSE PRACTITIONER

## 2022-02-24 PROCEDURE — 99213 OFFICE O/P EST LOW 20 MIN: CPT | Performed by: NURSE PRACTITIONER

## 2022-02-24 PROCEDURE — 80053 COMPREHEN METABOLIC PANEL: CPT | Performed by: NURSE PRACTITIONER

## 2022-02-24 PROCEDURE — 84443 ASSAY THYROID STIM HORMONE: CPT | Performed by: NURSE PRACTITIONER

## 2022-02-24 PROCEDURE — 82043 UR ALBUMIN QUANTITATIVE: CPT | Performed by: NURSE PRACTITIONER

## 2022-02-24 PROCEDURE — 36415 COLL VENOUS BLD VENIPUNCTURE: CPT | Performed by: NURSE PRACTITIONER

## 2022-02-24 PROCEDURE — 83036 HEMOGLOBIN GLYCOSYLATED A1C: CPT | Performed by: NURSE PRACTITIONER

## 2022-02-24 PROCEDURE — 80061 LIPID PANEL: CPT | Performed by: NURSE PRACTITIONER

## 2022-02-24 RX ORDER — LEVOTHYROXINE SODIUM 0.15 MG/1
150 TABLET ORAL DAILY
Qty: 90 TABLET | Refills: 1 | Status: SHIPPED | OUTPATIENT
Start: 2022-02-24 | End: 2022-09-06 | Stop reason: SDUPTHER

## 2022-02-24 NOTE — PROGRESS NOTES
Chief Complaint  Diabetes, Hypertension, Hyperlipidemia, and Follow-up (6 mo)    Subjective          Naveen Zapata presents to [facility] for   History of Present Illness     HTN, stable on meds and takes as directed, denies chest pain, headache, shortness of air, palpitations and swelling of extremities.     Diabetes mellitus type II, feels stable on meds, denies any signs/symptoms of hyper/hypoglycemia, blurry vision, polydipsia, polyuria, nocturia, and unintentional weight loss    He reports numbness of the left 2nd toe most mornings, no pain, burning or tingling.  Denies any known injury    Hyperlipidemia, The patient denies muscle aches, constipation, diarrhea, GI upset, fatigue, chest pain/pressure, exercise intolerance, dyspnea, palpitations, syncope and pedal edema.      Hypothyroidism, stable on medication, denies symptoms of constipation, weight gain/loss, hot or cold intolerance, hair loss, abnormal heart rate and fatigue.         The following portions of the patient's history were reviewed and updated as appropriate: allergies, current medications, past family history, past medical history, past social history, past surgical history and problem list.    Past Medical History:   Diagnosis Date   • History of echocardiogram 10/23/2019   • History of stress test 10/24/2019   • Hypothyroidism    • Syncope and collapse    • Type 2 diabetes mellitus (HCC)      Past Surgical History:   Procedure Laterality Date   • SHOULDER ARTHROSCOPY Right 10/02/2020     Family History   Problem Relation Age of Onset   • Diabetes Other    • Heart disease Mother    • Heart attack Father    • Stroke Father      Social History     Tobacco Use   • Smoking status: Former Smoker     Packs/day: 0.60     Years: 20.00     Pack years: 12.00     Types: Cigarettes     Quit date:      Years since quittin.1   • Smokeless tobacco: Never Used   • Tobacco comment: Patient states he smoked 1 pack per  "month   Substance Use Topics   • Alcohol use: No       Current Outpatient Medications:   •  empagliflozin (Jardiance) 25 MG tablet tablet, Take 1 tablet by mouth Daily., Disp: 90 tablet, Rfl: 1  •  glucose blood (FREESTYLE TEST STRIPS) test strip, Use to check blood glucose twice daily as needed DX: E11.65, Disp: 100 each, Rfl: 11  •  glucose monitoring kit (FREESTYLE) monitoring kit, Please provide glucometer on patient's formulary to check blood glucose twice daily. DX: E11.65, Disp: 1 each, Rfl: 0  •  Lancets (FREESTYLE) lancets, Use to check blood glucose twice daily as needed DX: E11.65, Disp: 100 each, Rfl: 11  •  levothyroxine (SYNTHROID, LEVOTHROID) 150 MCG tablet, Take 1 tablet by mouth Daily., Disp: 90 tablet, Rfl: 1  •  metFORMIN (GLUCOPHAGE) 1000 MG tablet, Take 1 tablet by mouth 2 (Two) Times a Day With Meals., Disp: 180 tablet, Rfl: 1    Objective   Vital Signs:   /79 (BP Location: Left arm, Patient Position: Sitting, Cuff Size: Adult)   Pulse 64   Ht 172.7 cm (67.99\")   Wt 80.5 kg (177 lb 6.4 oz)   SpO2 97%   BMI 26.98 kg/m²       Physical Exam  Vitals and nursing note reviewed.   Constitutional:       General: He is not in acute distress.     Appearance: He is well-developed. He is not diaphoretic.   HENT:      Head: Normocephalic and atraumatic.   Eyes:      Pupils: Pupils are equal, round, and reactive to light.   Neck:      Thyroid: No thyromegaly.   Cardiovascular:      Rate and Rhythm: Normal rate and regular rhythm.      Heart sounds: Normal heart sounds. No murmur heard.      Pulmonary:      Effort: Pulmonary effort is normal. No respiratory distress.      Breath sounds: Normal breath sounds.   Abdominal:      General: Bowel sounds are normal. There is no distension.      Palpations: Abdomen is soft.      Tenderness: There is no abdominal tenderness.   Musculoskeletal:         General: No tenderness or deformity. Normal range of motion.      Cervical back: Normal range of motion. "   Skin:     General: Skin is warm and dry.      Findings: No erythema.   Neurological:      Mental Status: He is alert and oriented to person, place, and time.   Psychiatric:         Behavior: Behavior normal.         Thought Content: Thought content normal.         Judgment: Judgment normal.          Result Review :     No visits with results within 7 Day(s) from this visit.   Latest known visit with results is:   Office Visit on 08/26/2021   Component Date Value Ref Range Status   • Glucose 08/26/2021 110* 65 - 99 mg/dL Final   • BUN 08/26/2021 12  6 - 20 mg/dL Final   • Creatinine 08/26/2021 0.87  0.76 - 1.27 mg/dL Final   • Sodium 08/26/2021 136  136 - 145 mmol/L Final   • Potassium 08/26/2021 4.3  3.5 - 5.2 mmol/L Final   • Chloride 08/26/2021 100  98 - 107 mmol/L Final   • CO2 08/26/2021 26.1  22.0 - 29.0 mmol/L Final   • Calcium 08/26/2021 8.6  8.6 - 10.5 mg/dL Final   • Total Protein 08/26/2021 7.2  6.0 - 8.5 g/dL Final   • Albumin 08/26/2021 4.50  3.50 - 5.20 g/dL Final   • ALT (SGPT) 08/26/2021 26  1 - 41 U/L Final   • AST (SGOT) 08/26/2021 25  1 - 40 U/L Final   • Alkaline Phosphatase 08/26/2021 39  39 - 117 U/L Final   • Total Bilirubin 08/26/2021 1.0  0.0 - 1.2 mg/dL Final   • eGFR Non African Amer 08/26/2021 95  >60 mL/min/1.73 Final   • eGFR   Amer 08/26/2021 115  >60 mL/min/1.73 Final   • Globulin 08/26/2021 2.7  gm/dL Final   • A/G Ratio 08/26/2021 1.7  g/dL Final   • BUN/Creatinine Ratio 08/26/2021 13.8  7.0 - 25.0 Final   • Anion Gap 08/26/2021 9.9  5.0 - 15.0 mmol/L Final   • WBC 08/26/2021 6.73  3.40 - 10.80 10*3/mm3 Final   • RBC 08/26/2021 5.21  4.14 - 5.80 10*6/mm3 Final   • Hemoglobin 08/26/2021 14.9  13.0 - 17.7 g/dL Final   • Hematocrit 08/26/2021 46.6  37.5 - 51.0 % Final   • MCV 08/26/2021 89.4  79.0 - 97.0 fL Final   • MCH 08/26/2021 28.6  26.6 - 33.0 pg Final   • MCHC 08/26/2021 32.0  31.5 - 35.7 g/dL Final   • RDW 08/26/2021 13.7  12.3 - 15.4 % Final   • RDW-SD 08/26/2021 44.9   37.0 - 54.0 fl Final   • MPV 08/26/2021 11.5  6.0 - 12.0 fL Final   • Platelets 08/26/2021 231  140 - 450 10*3/mm3 Final   • Hemoglobin A1C 08/26/2021 6.3* 3.5 - 5.6 % Final   • Total Cholesterol 08/26/2021 223* 0 - 200 mg/dL Final   • Triglycerides 08/26/2021 172* 0 - 150 mg/dL Final   • HDL Cholesterol 08/26/2021 63* 40 - 60 mg/dL Final   • LDL Cholesterol  08/26/2021 130* 0 - 100 mg/dL Final   • VLDL Cholesterol 08/26/2021 30  5 - 40 mg/dL Final   • LDL/HDL Ratio 08/26/2021 1.99   Final   • TSH 08/26/2021 5.420* 0.270 - 4.200 uIU/mL Final                       Assessment and Plan    Diagnoses and all orders for this visit:    1. Hyperlipidemia, unspecified hyperlipidemia type (Primary)  -     MicroAlbumin, Urine, Random - Urine, Clean Catch  -     Comprehensive Metabolic Panel  -     CBC (No Diff)  -     Hemoglobin A1c  -     Lipid Panel  -     TSH    2. Type 2 diabetes mellitus with hyperglycemia, without long-term current use of insulin (HCC)  -     metFORMIN (GLUCOPHAGE) 1000 MG tablet; Take 1 tablet by mouth 2 (Two) Times a Day With Meals.  Dispense: 180 tablet; Refill: 1  -     empagliflozin (Jardiance) 25 MG tablet tablet; Take 1 tablet by mouth Daily.  Dispense: 90 tablet; Refill: 1  -     MicroAlbumin, Urine, Random - Urine, Clean Catch  -     Comprehensive Metabolic Panel  -     CBC (No Diff)  -     Hemoglobin A1c    3. Acquired hypothyroidism  -     levothyroxine (SYNTHROID, LEVOTHROID) 150 MCG tablet; Take 1 tablet by mouth Daily.  Dispense: 90 tablet; Refill: 1  -     TSH      Conditions stable, rf meds as above  Labs collected today, will notify results  Recommend yearly diabetic eye exam and foot exam  Consider pod referral if left second toe numbness persists or worsens        I spent 25 minutes caring for Naveen Zapata on this date of service. This time includes time spent by me in the following activities: preparing for the visit, reviewing tests, performing a medically appropriate examination  and/or evaluation , counseling and educating the patient/family/caregiver, ordering medications, tests, or procedures and documenting information in the medical record        Follow Up     Return in about 6 months (around 8/24/2022) for Recheck.  Patient was given instructions and counseling regarding his condition or for health maintenance advice. Please see specific information pulled into the AVS if appropriate.        Part of this note may be an electronic transcription/translation of spoken language to printed text using the Dragon Dictation System

## 2022-02-25 LAB
ALBUMIN SERPL-MCNC: 4.5 G/DL (ref 3.5–5.2)
ALBUMIN UR-MCNC: <1.2 MG/DL
ALBUMIN/GLOB SERPL: 1.6 G/DL
ALP SERPL-CCNC: 39 U/L (ref 39–117)
ALT SERPL W P-5'-P-CCNC: 24 U/L (ref 1–41)
ANION GAP SERPL CALCULATED.3IONS-SCNC: 12.9 MMOL/L (ref 5–15)
AST SERPL-CCNC: 22 U/L (ref 1–40)
BILIRUB SERPL-MCNC: 0.7 MG/DL (ref 0–1.2)
BUN SERPL-MCNC: 14 MG/DL (ref 6–20)
BUN/CREAT SERPL: 17.5 (ref 7–25)
CALCIUM SPEC-SCNC: 9.4 MG/DL (ref 8.6–10.5)
CHLORIDE SERPL-SCNC: 102 MMOL/L (ref 98–107)
CHOLEST SERPL-MCNC: 208 MG/DL (ref 0–200)
CO2 SERPL-SCNC: 24.1 MMOL/L (ref 22–29)
CREAT SERPL-MCNC: 0.8 MG/DL (ref 0.76–1.27)
DEPRECATED RDW RBC AUTO: 38 FL (ref 37–54)
ERYTHROCYTE [DISTWIDTH] IN BLOOD BY AUTOMATED COUNT: 12.5 % (ref 12.3–15.4)
GFR SERPL CREATININE-BSD FRML MDRD: 105 ML/MIN/1.73
GFR SERPL CREATININE-BSD FRML MDRD: 127 ML/MIN/1.73
GLOBULIN UR ELPH-MCNC: 2.9 GM/DL
GLUCOSE SERPL-MCNC: 105 MG/DL (ref 65–99)
HBA1C MFR BLD: 6.3 % (ref 3.5–5.6)
HCT VFR BLD AUTO: 44.7 % (ref 37.5–51)
HDLC SERPL-MCNC: 57 MG/DL (ref 40–60)
HGB BLD-MCNC: 15.2 G/DL (ref 13–17.7)
LDLC SERPL CALC-MCNC: 113 MG/DL (ref 0–100)
LDLC/HDLC SERPL: 1.87 {RATIO}
MCH RBC QN AUTO: 29.1 PG (ref 26.6–33)
MCHC RBC AUTO-ENTMCNC: 34 G/DL (ref 31.5–35.7)
MCV RBC AUTO: 85.5 FL (ref 79–97)
PLATELET # BLD AUTO: 218 10*3/MM3 (ref 140–450)
PMV BLD AUTO: 11.3 FL (ref 6–12)
POTASSIUM SERPL-SCNC: 4.6 MMOL/L (ref 3.5–5.2)
PROT SERPL-MCNC: 7.4 G/DL (ref 6–8.5)
RBC # BLD AUTO: 5.23 10*6/MM3 (ref 4.14–5.8)
SODIUM SERPL-SCNC: 139 MMOL/L (ref 136–145)
TRIGL SERPL-MCNC: 222 MG/DL (ref 0–150)
TSH SERPL DL<=0.05 MIU/L-ACNC: 0.48 UIU/ML (ref 0.27–4.2)
VLDLC SERPL-MCNC: 38 MG/DL (ref 5–40)
WBC NRBC COR # BLD: 6.85 10*3/MM3 (ref 3.4–10.8)

## 2022-03-03 ENCOUNTER — TELEPHONE (OUTPATIENT)
Dept: FAMILY MEDICINE CLINIC | Facility: CLINIC | Age: 47
End: 2022-03-03

## 2022-03-03 NOTE — TELEPHONE ENCOUNTER
----- Message from GUSTABO Sauceda sent at 2/27/2022  5:13 PM EST -----  [email or web address] message sent to patient

## 2022-03-03 NOTE — TELEPHONE ENCOUNTER
Left message for patient to call regarding results.    HUB TO READ:  Mostly normal labs:   1. Diabetes stable, Hgba1c stable at 6.3. cont same meds, no changes.   2. Lipids, mostly triglycerides are elevated, should improve diet, limit fatty, fried, greasy foods and increase lean meats, increase water and exercise 30 min at least 3 days/week.   3. Thyroid in good range, liver kidneys, blood counts all normal.   4. Will see you back as scheduled, earlier prn.

## 2022-04-08 DIAGNOSIS — E11.65 TYPE 2 DIABETES MELLITUS WITH HYPERGLYCEMIA, WITHOUT LONG-TERM CURRENT USE OF INSULIN: ICD-10-CM

## 2022-08-18 DIAGNOSIS — E11.65 TYPE 2 DIABETES MELLITUS WITH HYPERGLYCEMIA, WITHOUT LONG-TERM CURRENT USE OF INSULIN: ICD-10-CM

## 2022-08-18 RX ORDER — EMPAGLIFLOZIN 25 MG/1
TABLET, FILM COATED ORAL
Qty: 90 TABLET | Refills: 1 | Status: SHIPPED | OUTPATIENT
Start: 2022-08-18 | End: 2023-02-14

## 2022-09-06 ENCOUNTER — OFFICE VISIT (OUTPATIENT)
Dept: FAMILY MEDICINE CLINIC | Facility: CLINIC | Age: 47
End: 2022-09-06

## 2022-09-06 VITALS
TEMPERATURE: 97.7 F | OXYGEN SATURATION: 98 % | SYSTOLIC BLOOD PRESSURE: 132 MMHG | BODY MASS INDEX: 25.46 KG/M2 | HEART RATE: 68 BPM | HEIGHT: 68 IN | WEIGHT: 168 LBS | DIASTOLIC BLOOD PRESSURE: 90 MMHG

## 2022-09-06 DIAGNOSIS — E03.9 ACQUIRED HYPOTHYROIDISM: ICD-10-CM

## 2022-09-06 DIAGNOSIS — E11.65 TYPE 2 DIABETES MELLITUS WITH HYPERGLYCEMIA, WITHOUT LONG-TERM CURRENT USE OF INSULIN: ICD-10-CM

## 2022-09-06 DIAGNOSIS — E78.5 HYPERLIPIDEMIA, UNSPECIFIED HYPERLIPIDEMIA TYPE: Primary | ICD-10-CM

## 2022-09-06 PROCEDURE — 80061 LIPID PANEL: CPT | Performed by: NURSE PRACTITIONER

## 2022-09-06 PROCEDURE — 85027 COMPLETE CBC AUTOMATED: CPT | Performed by: NURSE PRACTITIONER

## 2022-09-06 PROCEDURE — 99214 OFFICE O/P EST MOD 30 MIN: CPT | Performed by: NURSE PRACTITIONER

## 2022-09-06 PROCEDURE — 83036 HEMOGLOBIN GLYCOSYLATED A1C: CPT | Performed by: NURSE PRACTITIONER

## 2022-09-06 PROCEDURE — 84443 ASSAY THYROID STIM HORMONE: CPT | Performed by: NURSE PRACTITIONER

## 2022-09-06 PROCEDURE — 36415 COLL VENOUS BLD VENIPUNCTURE: CPT | Performed by: NURSE PRACTITIONER

## 2022-09-06 PROCEDURE — 80053 COMPREHEN METABOLIC PANEL: CPT | Performed by: NURSE PRACTITIONER

## 2022-09-06 RX ORDER — LEVOTHYROXINE SODIUM 0.15 MG/1
150 TABLET ORAL DAILY
Qty: 90 TABLET | Refills: 1 | Status: SHIPPED | OUTPATIENT
Start: 2022-09-06

## 2022-09-06 NOTE — PROGRESS NOTES
Chief Complaint  Chief Complaint   Patient presents with   • Hyperlipidemia   • Diabetes   • Hypothyroidism   • Follow-up     6 month follow up. Patient is not fasting.           Subjective          Naveen Zapata presents to [facility] for   History of Present Illness     HTN, borderline, not currently on medications, denies chest pain, headache, shortness of air, palpitations and swelling of extremities.     Hypothyroidism, stable on medication, denies symptoms of constipation, weight gain/loss, hot or cold intolerance, hair loss, abnormal heart rate and fatigue.       Hyperlipidemia, monitoring, working on diet and lifestyle, not on statin.  The patient denies muscle aches, constipation, diarrhea, GI upset, fatigue, chest pain/pressure, exercise intolerance, dyspnea, palpitations, syncope and pedal edema.      Diabetes mellitus type II, feels stable on meds, denies any signs/symptoms of hyper/hypoglycemia, blurry vision, polydipsia, polyuria, nocturia, and unintentional weight loss      The following portions of the patient's history were reviewed and updated as appropriate: allergies, current medications, past family history, past medical history, past social history, past surgical history and problem list.    Past Medical History:   Diagnosis Date   • History of echocardiogram 10/23/2019   • History of stress test 10/24/2019   • Hypothyroidism    • Syncope and collapse    • Type 2 diabetes mellitus (HCC)      Past Surgical History:   Procedure Laterality Date   • SHOULDER ARTHROSCOPY Right 10/02/2020     Family History   Problem Relation Age of Onset   • Diabetes Other    • Heart disease Mother    • Heart attack Father    • Stroke Father      Social History     Tobacco Use   • Smoking status: Former Smoker     Packs/day: 0.60     Years: 20.00     Pack years: 12.00     Types: Cigarettes     Start date:      Quit date:      Years since quittin.6   • Smokeless tobacco:  "Never Used   • Tobacco comment: Patient states he smoked 1 pack per month   Substance Use Topics   • Alcohol use: No       Current Outpatient Medications:   •  glucose blood (FREESTYLE TEST STRIPS) test strip, Use to check blood glucose twice daily as needed DX: E11.65, Disp: 100 each, Rfl: 11  •  glucose monitoring kit (FREESTYLE) monitoring kit, Please provide glucometer on patient's formulary to check blood glucose twice daily. DX: E11.65, Disp: 1 each, Rfl: 0  •  Jardiance 25 MG tablet tablet, TAKE 1 TABLET BY MOUTH DAILY, Disp: 90 tablet, Rfl: 1  •  Lancets (FREESTYLE) lancets, Use to check blood glucose twice daily as needed DX: E11.65, Disp: 100 each, Rfl: 11  •  levothyroxine (SYNTHROID, LEVOTHROID) 150 MCG tablet, Take 1 tablet by mouth Daily., Disp: 90 tablet, Rfl: 1  •  metFORMIN (GLUCOPHAGE) 1000 MG tablet, Take 1 tablet by mouth 2 (Two) Times a Day With Meals., Disp: 180 tablet, Rfl: 1    Objective   Vital Signs:   /90 (BP Location: Left arm, Patient Position: Sitting, Cuff Size: Adult)   Pulse 68   Temp 97.7 °F (36.5 °C) (Temporal)   Ht 172.7 cm (68\")   Wt 76.2 kg (168 lb)   SpO2 98%   BMI 25.54 kg/m²           Physical Exam  Vitals and nursing note reviewed.   Constitutional:       General: He is not in acute distress.     Appearance: He is well-developed. He is not diaphoretic.   HENT:      Head: Normocephalic and atraumatic.   Eyes:      Pupils: Pupils are equal, round, and reactive to light.   Neck:      Thyroid: No thyromegaly.   Cardiovascular:      Rate and Rhythm: Normal rate and regular rhythm.      Heart sounds: Normal heart sounds. No murmur heard.  Pulmonary:      Effort: Pulmonary effort is normal. No respiratory distress.      Breath sounds: Normal breath sounds.   Abdominal:      General: Bowel sounds are normal. There is no distension.      Palpations: Abdomen is soft.      Tenderness: There is no abdominal tenderness.   Musculoskeletal:         General: Normal range of " motion.      Cervical back: Normal range of motion.   Skin:     General: Skin is warm and dry.      Findings: No erythema.   Neurological:      Mental Status: He is alert and oriented to person, place, and time.   Psychiatric:         Behavior: Behavior normal.         Thought Content: Thought content normal.         Judgment: Judgment normal.          Result Review :     No visits with results within 7 Day(s) from this visit.   Latest known visit with results is:   Office Visit on 02/24/2022   Component Date Value Ref Range Status   • Microalbumin, Urine 02/24/2022 <1.2  mg/dL Final   • Glucose 02/24/2022 105 (A) 65 - 99 mg/dL Final   • BUN 02/24/2022 14  6 - 20 mg/dL Final   • Creatinine 02/24/2022 0.80  0.76 - 1.27 mg/dL Final   • Sodium 02/24/2022 139  136 - 145 mmol/L Final   • Potassium 02/24/2022 4.6  3.5 - 5.2 mmol/L Final   • Chloride 02/24/2022 102  98 - 107 mmol/L Final   • CO2 02/24/2022 24.1  22.0 - 29.0 mmol/L Final   • Calcium 02/24/2022 9.4  8.6 - 10.5 mg/dL Final   • Total Protein 02/24/2022 7.4  6.0 - 8.5 g/dL Final   • Albumin 02/24/2022 4.50  3.50 - 5.20 g/dL Final   • ALT (SGPT) 02/24/2022 24  1 - 41 U/L Final   • AST (SGOT) 02/24/2022 22  1 - 40 U/L Final   • Alkaline Phosphatase 02/24/2022 39  39 - 117 U/L Final   • Total Bilirubin 02/24/2022 0.7  0.0 - 1.2 mg/dL Final   • eGFR Non African Amer 02/24/2022 105  >60 mL/min/1.73 Final   • eGFR   Amer 02/24/2022 127  >60 mL/min/1.73 Final   • Globulin 02/24/2022 2.9  gm/dL Final   • A/G Ratio 02/24/2022 1.6  g/dL Final   • BUN/Creatinine Ratio 02/24/2022 17.5  7.0 - 25.0 Final   • Anion Gap 02/24/2022 12.9  5.0 - 15.0 mmol/L Final   • WBC 02/24/2022 6.85  3.40 - 10.80 10*3/mm3 Final   • RBC 02/24/2022 5.23  4.14 - 5.80 10*6/mm3 Final   • Hemoglobin 02/24/2022 15.2  13.0 - 17.7 g/dL Final   • Hematocrit 02/24/2022 44.7  37.5 - 51.0 % Final   • MCV 02/24/2022 85.5  79.0 - 97.0 fL Final   • MCH 02/24/2022 29.1  26.6 - 33.0 pg Final   • MCHC  02/24/2022 34.0  31.5 - 35.7 g/dL Final   • RDW 02/24/2022 12.5  12.3 - 15.4 % Final   • RDW-SD 02/24/2022 38.0  37.0 - 54.0 fl Final   • MPV 02/24/2022 11.3  6.0 - 12.0 fL Final   • Platelets 02/24/2022 218  140 - 450 10*3/mm3 Final   • Hemoglobin A1C 02/24/2022 6.3 (A) 3.5 - 5.6 % Final   • Total Cholesterol 02/24/2022 208 (A) 0 - 200 mg/dL Final   • Triglycerides 02/24/2022 222 (A) 0 - 150 mg/dL Final   • HDL Cholesterol 02/24/2022 57  40 - 60 mg/dL Final   • LDL Cholesterol  02/24/2022 113 (A) 0 - 100 mg/dL Final   • VLDL Cholesterol 02/24/2022 38  5 - 40 mg/dL Final   • LDL/HDL Ratio 02/24/2022 1.87   Final   • TSH 02/24/2022 0.477  0.270 - 4.200 uIU/mL Final                  BMI is >= 25 and <30. (Overweight) The following options were offered after discussion;: exercise counseling/recommendations and nutrition counseling/recommendations           Assessment and Plan    Diagnoses and all orders for this visit:    1. Hyperlipidemia, unspecified hyperlipidemia type (Primary)  -     Lipid Panel    2. Acquired hypothyroidism  -     TSH  -     levothyroxine (SYNTHROID, LEVOTHROID) 150 MCG tablet; Take 1 tablet by mouth Daily.  Dispense: 90 tablet; Refill: 1    3. Type 2 diabetes mellitus with hyperglycemia, without long-term current use of insulin (HCC)  -     Comprehensive Metabolic Panel  -     CBC (No Diff)  -     Hemoglobin A1c  -     metFORMIN (GLUCOPHAGE) 1000 MG tablet; Take 1 tablet by mouth 2 (Two) Times a Day With Meals.  Dispense: 180 tablet; Refill: 1      Conditions stable, continue current medication regimen  Refill levothyroxine, and metformin  Jardiance recently refilled  Labs ordered today as above, will notify results  Cologuard neg 2021        I spent 30 minutes caring for Naveen Zapata on this date of service. This time includes time spent by me in the following activities: preparing for the visit, reviewing tests, performing a medically appropriate examination and/or evaluation , counseling and  educating the patient/family/caregiver, ordering medications, tests, or procedures and documenting information in the medical record        Follow Up     Return in about 6 months (around 3/6/2023) for Recheck, Annual physical.  Patient was given instructions and counseling regarding his condition or for health maintenance advice. Please see specific information pulled into the AVS if appropriate.        Part of this note may be an electronic transcription/translation of spoken language to printed text using the Dragon Dictation System

## 2022-09-07 LAB
ALBUMIN SERPL-MCNC: 4.7 G/DL (ref 3.5–5.2)
ALBUMIN/GLOB SERPL: 2 G/DL
ALP SERPL-CCNC: 35 U/L (ref 39–117)
ALT SERPL W P-5'-P-CCNC: 19 U/L (ref 1–41)
ANION GAP SERPL CALCULATED.3IONS-SCNC: 13 MMOL/L (ref 5–15)
AST SERPL-CCNC: 23 U/L (ref 1–40)
BILIRUB SERPL-MCNC: 0.7 MG/DL (ref 0–1.2)
BUN SERPL-MCNC: 12 MG/DL (ref 6–20)
BUN/CREAT SERPL: 19 (ref 7–25)
CALCIUM SPEC-SCNC: 9.2 MG/DL (ref 8.6–10.5)
CHLORIDE SERPL-SCNC: 101 MMOL/L (ref 98–107)
CHOLEST SERPL-MCNC: 199 MG/DL (ref 0–200)
CO2 SERPL-SCNC: 24 MMOL/L (ref 22–29)
CREAT SERPL-MCNC: 0.63 MG/DL (ref 0.76–1.27)
DEPRECATED RDW RBC AUTO: 41.1 FL (ref 37–54)
EGFRCR SERPLBLD CKD-EPI 2021: 118.8 ML/MIN/1.73
ERYTHROCYTE [DISTWIDTH] IN BLOOD BY AUTOMATED COUNT: 13 % (ref 12.3–15.4)
GLOBULIN UR ELPH-MCNC: 2.3 GM/DL
GLUCOSE SERPL-MCNC: 93 MG/DL (ref 65–99)
HBA1C MFR BLD: 5.9 % (ref 3.5–5.6)
HCT VFR BLD AUTO: 45.1 % (ref 37.5–51)
HDLC SERPL-MCNC: 70 MG/DL (ref 40–60)
HGB BLD-MCNC: 15.2 G/DL (ref 13–17.7)
LDLC SERPL CALC-MCNC: 104 MG/DL (ref 0–100)
LDLC/HDLC SERPL: 1.42 {RATIO}
MCH RBC QN AUTO: 29.4 PG (ref 26.6–33)
MCHC RBC AUTO-ENTMCNC: 33.7 G/DL (ref 31.5–35.7)
MCV RBC AUTO: 87.2 FL (ref 79–97)
PLATELET # BLD AUTO: 210 10*3/MM3 (ref 140–450)
PMV BLD AUTO: 11.8 FL (ref 6–12)
POTASSIUM SERPL-SCNC: 4.3 MMOL/L (ref 3.5–5.2)
PROT SERPL-MCNC: 7 G/DL (ref 6–8.5)
RBC # BLD AUTO: 5.17 10*6/MM3 (ref 4.14–5.8)
SODIUM SERPL-SCNC: 138 MMOL/L (ref 136–145)
TRIGL SERPL-MCNC: 147 MG/DL (ref 0–150)
TSH SERPL DL<=0.05 MIU/L-ACNC: 0.51 UIU/ML (ref 0.27–4.2)
VLDLC SERPL-MCNC: 25 MG/DL (ref 5–40)
WBC NRBC COR # BLD: 6.67 10*3/MM3 (ref 3.4–10.8)

## 2023-01-23 ENCOUNTER — TELEPHONE (OUTPATIENT)
Dept: FAMILY MEDICINE CLINIC | Facility: CLINIC | Age: 48
End: 2023-01-23
Payer: COMMERCIAL

## 2023-01-23 NOTE — TELEPHONE ENCOUNTER
Attempted to call pt regarding 3/14 appt needing to reschedule, no answer: per verbal left msg for pt to call office to reschedule

## 2023-02-14 DIAGNOSIS — E11.65 TYPE 2 DIABETES MELLITUS WITH HYPERGLYCEMIA, WITHOUT LONG-TERM CURRENT USE OF INSULIN: ICD-10-CM

## 2023-02-14 RX ORDER — EMPAGLIFLOZIN 25 MG/1
TABLET, FILM COATED ORAL
Qty: 90 TABLET | Refills: 1 | Status: SHIPPED | OUTPATIENT
Start: 2023-02-14

## 2023-03-06 ENCOUNTER — TELEPHONE (OUTPATIENT)
Dept: FAMILY MEDICINE CLINIC | Facility: CLINIC | Age: 48
End: 2023-03-06
Payer: COMMERCIAL

## 2023-03-06 NOTE — TELEPHONE ENCOUNTER
Called insurance since I was not able to submit PA alone, insurance rep stated Jardiance does not need PA but pt needs to enroll in Co-Pay assistance program. Pt can call [phone] to enroll. Left detailed message notifying pt of message

## 2023-03-06 NOTE — TELEPHONE ENCOUNTER
Caller: Naveen Zapata    Relationship: Self    Best call back number: [phone]    What was the call regarding: PATIENT STATES HE WAS INFORMED BY PHARMACY THAT HIS Jardiance 25 MG tablet tablet IS NEEDING PRIOR AUTHORIZATION.     PLEASE ADVISE.

## 2023-03-09 ENCOUNTER — CLINICAL SUPPORT (OUTPATIENT)
Dept: FAMILY MEDICINE CLINIC | Facility: CLINIC | Age: 48
End: 2023-03-09
Payer: COMMERCIAL

## 2023-03-09 DIAGNOSIS — E78.5 HYPERLIPIDEMIA, UNSPECIFIED HYPERLIPIDEMIA TYPE: ICD-10-CM

## 2023-03-09 DIAGNOSIS — E11.65 TYPE 2 DIABETES MELLITUS WITH HYPERGLYCEMIA, WITHOUT LONG-TERM CURRENT USE OF INSULIN: Primary | ICD-10-CM

## 2023-03-09 DIAGNOSIS — E03.9 ACQUIRED HYPOTHYROIDISM: ICD-10-CM

## 2023-03-09 PROCEDURE — 80053 COMPREHEN METABOLIC PANEL: CPT | Performed by: NURSE PRACTITIONER

## 2023-03-09 PROCEDURE — 85027 COMPLETE CBC AUTOMATED: CPT | Performed by: NURSE PRACTITIONER

## 2023-03-09 PROCEDURE — 36415 COLL VENOUS BLD VENIPUNCTURE: CPT | Performed by: NURSE PRACTITIONER

## 2023-03-09 PROCEDURE — 84443 ASSAY THYROID STIM HORMONE: CPT | Performed by: NURSE PRACTITIONER

## 2023-03-09 PROCEDURE — 83036 HEMOGLOBIN GLYCOSYLATED A1C: CPT | Performed by: NURSE PRACTITIONER

## 2023-03-09 PROCEDURE — 80061 LIPID PANEL: CPT | Performed by: NURSE PRACTITIONER

## 2023-03-09 NOTE — PROGRESS NOTES
Venipuncture Blood Specimen Collection  Venipuncture performed in the left arm by Suma Tomlinson MA with good hemostasis. Patient tolerated the procedure well without complications.   03/09/23   Suma Tomlinson MA

## 2023-03-10 LAB
ALBUMIN SERPL-MCNC: 4.8 G/DL (ref 3.5–5.2)
ALBUMIN/GLOB SERPL: 2 G/DL
ALP SERPL-CCNC: 36 U/L (ref 39–117)
ALT SERPL W P-5'-P-CCNC: 26 U/L (ref 1–41)
ANION GAP SERPL CALCULATED.3IONS-SCNC: 13.2 MMOL/L (ref 5–15)
AST SERPL-CCNC: 22 U/L (ref 1–40)
BILIRUB SERPL-MCNC: 0.8 MG/DL (ref 0–1.2)
BUN SERPL-MCNC: 7 MG/DL (ref 6–20)
BUN/CREAT SERPL: 10.8 (ref 7–25)
CALCIUM SPEC-SCNC: 9.3 MG/DL (ref 8.6–10.5)
CHLORIDE SERPL-SCNC: 99 MMOL/L (ref 98–107)
CHOLEST SERPL-MCNC: 211 MG/DL (ref 0–200)
CO2 SERPL-SCNC: 23.8 MMOL/L (ref 22–29)
CREAT SERPL-MCNC: 0.65 MG/DL (ref 0.76–1.27)
DEPRECATED RDW RBC AUTO: 38.2 FL (ref 37–54)
EGFRCR SERPLBLD CKD-EPI 2021: 117.7 ML/MIN/1.73
ERYTHROCYTE [DISTWIDTH] IN BLOOD BY AUTOMATED COUNT: 12.2 % (ref 12.3–15.4)
GLOBULIN UR ELPH-MCNC: 2.4 GM/DL
GLUCOSE SERPL-MCNC: 93 MG/DL (ref 65–99)
HBA1C MFR BLD: 6 % (ref 4.8–5.6)
HCT VFR BLD AUTO: 45.4 % (ref 37.5–51)
HDLC SERPL-MCNC: 63 MG/DL (ref 40–60)
HGB BLD-MCNC: 15.5 G/DL (ref 13–17.7)
LDLC SERPL CALC-MCNC: 109 MG/DL (ref 0–100)
LDLC/HDLC SERPL: 1.62 {RATIO}
MCH RBC QN AUTO: 29.5 PG (ref 26.6–33)
MCHC RBC AUTO-ENTMCNC: 34.1 G/DL (ref 31.5–35.7)
MCV RBC AUTO: 86.3 FL (ref 79–97)
PLATELET # BLD AUTO: 226 10*3/MM3 (ref 140–450)
PMV BLD AUTO: 11.7 FL (ref 6–12)
POTASSIUM SERPL-SCNC: 4.1 MMOL/L (ref 3.5–5.2)
PROT SERPL-MCNC: 7.2 G/DL (ref 6–8.5)
RBC # BLD AUTO: 5.26 10*6/MM3 (ref 4.14–5.8)
SODIUM SERPL-SCNC: 136 MMOL/L (ref 136–145)
TRIGL SERPL-MCNC: 230 MG/DL (ref 0–150)
TSH SERPL DL<=0.05 MIU/L-ACNC: 0.26 UIU/ML (ref 0.27–4.2)
VLDLC SERPL-MCNC: 39 MG/DL (ref 5–40)
WBC NRBC COR # BLD: 7.08 10*3/MM3 (ref 3.4–10.8)

## 2023-04-08 DIAGNOSIS — E11.65 TYPE 2 DIABETES MELLITUS WITH HYPERGLYCEMIA, WITHOUT LONG-TERM CURRENT USE OF INSULIN: ICD-10-CM

## 2023-04-08 DIAGNOSIS — E03.9 ACQUIRED HYPOTHYROIDISM: ICD-10-CM

## 2023-04-11 RX ORDER — LEVOTHYROXINE SODIUM 0.15 MG/1
150 TABLET ORAL DAILY
Qty: 90 TABLET | Refills: 1 | Status: SHIPPED | OUTPATIENT
Start: 2023-04-11

## 2023-05-17 ENCOUNTER — OFFICE VISIT (OUTPATIENT)
Dept: FAMILY MEDICINE CLINIC | Facility: CLINIC | Age: 48
End: 2023-05-17
Payer: COMMERCIAL

## 2023-05-17 VITALS
HEIGHT: 68 IN | WEIGHT: 174.8 LBS | OXYGEN SATURATION: 100 % | DIASTOLIC BLOOD PRESSURE: 78 MMHG | HEART RATE: 64 BPM | BODY MASS INDEX: 26.49 KG/M2 | SYSTOLIC BLOOD PRESSURE: 124 MMHG

## 2023-05-17 DIAGNOSIS — E11.65 TYPE 2 DIABETES MELLITUS WITH HYPERGLYCEMIA, WITHOUT LONG-TERM CURRENT USE OF INSULIN: Primary | ICD-10-CM

## 2023-05-17 DIAGNOSIS — L30.9 ECZEMA, UNSPECIFIED TYPE: ICD-10-CM

## 2023-05-17 DIAGNOSIS — M67.479 DIGITAL MUCINOUS CYST OF TOE: ICD-10-CM

## 2023-05-17 DIAGNOSIS — Z23 NEED FOR TDAP VACCINATION: ICD-10-CM

## 2023-05-17 DIAGNOSIS — Z23 NEED FOR PNEUMOCOCCAL VACCINATION: ICD-10-CM

## 2023-05-17 DIAGNOSIS — E03.9 ACQUIRED HYPOTHYROIDISM: ICD-10-CM

## 2023-05-17 PROCEDURE — 84443 ASSAY THYROID STIM HORMONE: CPT | Performed by: NURSE PRACTITIONER

## 2023-05-17 RX ORDER — TRIAMCINOLONE ACETONIDE 1 MG/G
1 CREAM TOPICAL 2 TIMES DAILY
Qty: 453.6 G | Refills: 0 | Status: SHIPPED | OUTPATIENT
Start: 2023-05-17

## 2023-05-17 NOTE — PROGRESS NOTES
Venipuncture Blood Specimen Collection  Venipuncture performed in right arm by Rea Cerrato MA with good hemostasis. Patient tolerated the procedure well without complications.   05/17/23   Rea Cerrato MA      Injection  Injection performed in left arm by Rea Cerrato MA. Patient tolerated the procedure well without complications.  05/17/23   Rea Cerrato MA

## 2023-05-17 NOTE — PROGRESS NOTES
Chief Complaint  Chief Complaint   Patient presents with   • Annual Exam   • Itching     Pt has itching on across upper back, pt says he was previously clobetasol and this helped itching, pt would like refill unless provider has anther opinion    • wart     Pt has wart on right 3rd toe, pt says it sometimes goes away but then comes back           Subjective          Naveen Zapata presents to [facility] for   History of Present Illness     Patient presents for annual exam, he is overall doing well, he reports having itching of the upper back and a blister type lesion on the right fourth toe that fills with fluid, it busts and then recurs every few months.  He also reports extreme hunger in the middle of the night.     Diabetes mellitus type II, feels stable on meds, denies any signs/symptoms of hyper/hypoglycemia, blurry vision, polydipsia, polyuria, nocturia, and unintentional weight loss    Hypothyroidism, stable on medication, denies symptoms of constipation, weight gain/loss, hot or cold intolerance, hair loss, abnormal heart rate and fatigue.     He had labs drawn in March, here to review      The following portions of the patient's history were reviewed and updated as appropriate: allergies, current medications, past family history, past medical history, past social history, past surgical history and problem list.    Past Medical History:   Diagnosis Date   • History of echocardiogram 10/23/2019   • History of stress test 10/24/2019   • Hypothyroidism    • Syncope and collapse    • Type 2 diabetes mellitus      Past Surgical History:   Procedure Laterality Date   • SHOULDER ARTHROSCOPY Right 10/02/2020     Family History   Problem Relation Age of Onset   • Diabetes Other    • Heart disease Mother    • Heart attack Father    • Stroke Father      Social History     Tobacco Use   • Smoking status: Former     Packs/day: 0.60     Years: 20.00     Pack years: 12.00     Types: Cigarettes  "    Start date: 1993     Quit date: 2013     Years since quitting: 10.3   • Smokeless tobacco: Never   • Tobacco comments:     Patient states he smoked 1 pack per month   Substance Use Topics   • Alcohol use: No       Current Outpatient Medications:   •  glucose blood (FREESTYLE TEST STRIPS) test strip, Use to check blood glucose twice daily as needed DX: E11.65, Disp: 100 each, Rfl: 11  •  glucose monitoring kit (FREESTYLE) monitoring kit, Please provide glucometer on patient's formulary to check blood glucose twice daily. DX: E11.65, Disp: 1 each, Rfl: 0  •  Jardiance 25 MG tablet tablet, TAKE 1 TABLET BY MOUTH DAILY, Disp: 90 tablet, Rfl: 1  •  Lancets (FREESTYLE) lancets, Use to check blood glucose twice daily as needed DX: E11.65, Disp: 100 each, Rfl: 11  •  levothyroxine (SYNTHROID, LEVOTHROID) 150 MCG tablet, TAKE 1 TABLET BY MOUTH DAILY, Disp: 90 tablet, Rfl: 1  •  metFORMIN (GLUCOPHAGE) 1000 MG tablet, TAKE 1 TABLET BY MOUTH TWICE DAILY WITH MEALS, Disp: 180 tablet, Rfl: 1  •  triamcinolone (KENALOG) 0.1 % cream, Apply 1 application topically to the appropriate area as directed 2 (Two) Times a Day., Disp: 453.6 g, Rfl: 0    Objective   Vital Signs:   /78 (BP Location: Right arm, Patient Position: Sitting, Cuff Size: Adult)   Pulse 64   Ht 172.7 cm (68\")   Wt 79.3 kg (174 lb 12.8 oz)   SpO2 100%   BMI 26.58 kg/m²           Physical Exam  Vitals and nursing note reviewed.   Constitutional:       General: He is not in acute distress.     Appearance: He is well-developed. He is not diaphoretic.   HENT:      Head: Normocephalic and atraumatic.   Eyes:      Pupils: Pupils are equal, round, and reactive to light.   Neck:      Thyroid: No thyromegaly.   Cardiovascular:      Rate and Rhythm: Normal rate and regular rhythm.      Heart sounds: Normal heart sounds. No murmur heard.  Pulmonary:      Effort: Pulmonary effort is normal. No respiratory distress.      Breath sounds: Normal breath sounds. "   Abdominal:      General: Bowel sounds are normal. There is no distension.      Palpations: Abdomen is soft.      Tenderness: There is no abdominal tenderness.   Musculoskeletal:         General: Normal range of motion.      Cervical back: Normal range of motion.   Skin:     General: Skin is warm and dry.      Findings: Lesion (right 4th toenail bed with fluid filled cyst) and rash (upper back discoloration, eczematous pruritic patches) present. No erythema.   Neurological:      Mental Status: He is alert and oriented to person, place, and time.   Psychiatric:         Behavior: Behavior normal.         Thought Content: Thought content normal.         Judgment: Judgment normal.          Result Review :     No visits with results within 7 Day(s) from this visit.   Latest known visit with results is:   Clinical Support on 03/09/2023   Component Date Value Ref Range Status   • WBC 03/09/2023 7.08  3.40 - 10.80 10*3/mm3 Final   • RBC 03/09/2023 5.26  4.14 - 5.80 10*6/mm3 Final   • Hemoglobin 03/09/2023 15.5  13.0 - 17.7 g/dL Final   • Hematocrit 03/09/2023 45.4  37.5 - 51.0 % Final   • MCV 03/09/2023 86.3  79.0 - 97.0 fL Final   • MCH 03/09/2023 29.5  26.6 - 33.0 pg Final   • MCHC 03/09/2023 34.1  31.5 - 35.7 g/dL Final   • RDW 03/09/2023 12.2 (L)  12.3 - 15.4 % Final   • RDW-SD 03/09/2023 38.2  37.0 - 54.0 fl Final   • MPV 03/09/2023 11.7  6.0 - 12.0 fL Final   • Platelets 03/09/2023 226  140 - 450 10*3/mm3 Final   • Glucose 03/09/2023 93  65 - 99 mg/dL Final   • BUN 03/09/2023 7  6 - 20 mg/dL Final   • Creatinine 03/09/2023 0.65 (L)  0.76 - 1.27 mg/dL Final   • Sodium 03/09/2023 136  136 - 145 mmol/L Final   • Potassium 03/09/2023 4.1  3.5 - 5.2 mmol/L Final   • Chloride 03/09/2023 99  98 - 107 mmol/L Final   • CO2 03/09/2023 23.8  22.0 - 29.0 mmol/L Final   • Calcium 03/09/2023 9.3  8.6 - 10.5 mg/dL Final   • Total Protein 03/09/2023 7.2  6.0 - 8.5 g/dL Final   • Albumin 03/09/2023 4.8  3.5 - 5.2 g/dL Final   • ALT  (SGPT) 03/09/2023 26  1 - 41 U/L Final   • AST (SGOT) 03/09/2023 22  1 - 40 U/L Final   • Alkaline Phosphatase 03/09/2023 36 (L)  39 - 117 U/L Final   • Total Bilirubin 03/09/2023 0.8  0.0 - 1.2 mg/dL Final   • Globulin 03/09/2023 2.4  gm/dL Final   • A/G Ratio 03/09/2023 2.0  g/dL Final   • BUN/Creatinine Ratio 03/09/2023 10.8  7.0 - 25.0 Final   • Anion Gap 03/09/2023 13.2  5.0 - 15.0 mmol/L Final   • eGFR 03/09/2023 117.7  >60.0 mL/min/1.73 Final   • Total Cholesterol 03/09/2023 211 (H)  0 - 200 mg/dL Final   • Triglycerides 03/09/2023 230 (H)  0 - 150 mg/dL Final   • HDL Cholesterol 03/09/2023 63 (H)  40 - 60 mg/dL Final   • LDL Cholesterol  03/09/2023 109 (H)  0 - 100 mg/dL Final   • VLDL Cholesterol 03/09/2023 39  5 - 40 mg/dL Final   • LDL/HDL Ratio 03/09/2023 1.62   Final   • Hemoglobin A1C 03/09/2023 6.00 (H)  4.80 - 5.60 % Final   • TSH 03/09/2023 0.258 (L)  0.270 - 4.200 uIU/mL Final                  BMI is >= 25 and <30. (Overweight) The following options were offered after discussion;: exercise counseling/recommendations and nutrition counseling/recommendations           Assessment and Plan    Diagnoses and all orders for this visit:    1. Type 2 diabetes mellitus with hyperglycemia, without long-term current use of insulin (Primary)    2. Need for Tdap vaccination  -     Tdap Vaccine Greater Than or Equal To 6yo IM    3. Need for pneumococcal vaccination  -     Pneumococcal Conjugate Vaccine 20-Valent (PCV20)    4. Eczema, unspecified type    5. Digital mucinous cyst of toe  -     Ambulatory Referral to Podiatry    6. Acquired hypothyroidism  -     TSH    Other orders  -     triamcinolone (KENALOG) 0.1 % cream; Apply 1 application topically to the appropriate area as directed 2 (Two) Times a Day.  Dispense: 453.6 g; Refill: 0      Labs reviewed, mostly stable  A1c 6, continue Jardiance and metformin, diabetic diet, continue exercise  TSH borderline hyperactive, recheck today  Referral to  podiatry  Continue current medication regimen, refills when needed  Try triamcinolone cream to the upper back  DM eye exam planned  Tdap and pneumo 20 vaccines today  Age appropriate preventative counseling provided, including healthy lifestyle modifications and exercise  Cologuard due 2024      I spent 30 minutes caring for Naveen Zapata on this date of service. This time includes time spent by me in the following activities: preparing for the visit, reviewing tests, performing a medically appropriate examination and/or evaluation , counseling and educating the patient/family/caregiver, ordering medications, tests, or procedures and documenting information in the medical record        Follow Up     Return in about 6 months (around 11/17/2023) for Recheck DM, Thyroid. DM panel, urine micro and PSA prior to appt.  Patient was given instructions and counseling regarding his condition or for health maintenance advice. Please see specific information pulled into the AVS if appropriate.        Part of this note may be an electronic transcription/translation of spoken language to printed text using the Dragon Dictation System

## 2023-05-18 LAB — TSH SERPL DL<=0.05 MIU/L-ACNC: 0.56 UIU/ML (ref 0.27–4.2)

## 2023-08-14 ENCOUNTER — OFFICE VISIT (OUTPATIENT)
Dept: PODIATRY | Facility: CLINIC | Age: 48
End: 2023-08-14
Payer: COMMERCIAL

## 2023-08-14 VITALS — WEIGHT: 174 LBS | HEIGHT: 68 IN | BODY MASS INDEX: 26.37 KG/M2 | HEART RATE: 83 BPM | OXYGEN SATURATION: 98 %

## 2023-08-14 DIAGNOSIS — E11.65 TYPE 2 DIABETES MELLITUS WITH HYPERGLYCEMIA, WITHOUT LONG-TERM CURRENT USE OF INSULIN: ICD-10-CM

## 2023-08-14 DIAGNOSIS — M67.471 DIGITAL MUCINOUS CYST OF TOE OF RIGHT FOOT: Primary | ICD-10-CM

## 2023-08-14 NOTE — PROGRESS NOTES
"08/14/2023  Foot and Ankle Surgery - New Patient   Provider: Dr. Nick Bah DPM  Location: [facility]    Subjective:  Naveen Zapata is a 48 y.o. male.     Chief Complaint   Patient presents with    Right Foot - Cyst, Diabetes     3th toe     Establish [facility]     JAQUELINE Inman APRN 05/2023       HPI: The patient is a 48-year-old male who presents for issues involving his right foot.    He reports he has a recurrent \"bump\" on his right 3rd toe onset in approximately 02/2023. He adds that it \"popped\" spontaneously approximately 2 weeks ago revealing clear drainage, but it is beginning to recur at this time. The patient states the area becomes tender when enlarged.      Additionally, the patient reports approximately a 20-year history of diabetes mellitus type 2. He states his last A1c was 5.9 percent. He adds that he is not able to walk and exercise as he used to, but he is on Jardiance and metformin. The patient denies any open wounds or infections to his feet. He denies any numbness, tingling, or burning to his feet. He states he has not seen a podiatrist in the past.    The patient reports a family history of diabetes involving both the maternal and paternal sides of his family.    The patient works from home.    No Known Allergies    Past Medical History:   Diagnosis Date    History of echocardiogram 10/23/2019    Normal chamber sizes. Normal LV and RV systolic function. EF 65% Normal wall motion.     History of stress test 10/24/2019    Normal nuclear perfuaion stress without evidence of ischemia.     Hypothyroidism     Syncope and collapse     Type 2 diabetes mellitus        Past Surgical History:   Procedure Laterality Date    SHOULDER ARTHROSCOPY Right 10/02/2020       Family History   Problem Relation Age of Onset    Diabetes Other     Heart disease Mother     Heart attack Father     Stroke Father        Social History     Socioeconomic History    Marital status:    Tobacco Use    Smoking " status: Former     Packs/day: 0.60     Years: 20.00     Pack years: 12.00     Types: Cigarettes     Start date: 1993     Quit date: 2013     Years since quitting: 10.6    Smokeless tobacco: Never    Tobacco comments:     Patient states he smoked 1 pack per month   Vaping Use    Vaping Use: Never used   Substance and Sexual Activity    Alcohol use: No    Drug use: No    Sexual activity: Defer        Current Outpatient Medications on File Prior to Visit   Medication Sig Dispense Refill    glucose blood (FREESTYLE TEST STRIPS) test strip Use to check blood glucose twice daily as needed DX: E11.65 100 each 11    glucose monitoring kit (FREESTYLE) monitoring kit Please provide glucometer on patient's formulary to check blood glucose twice daily. DX: E11.65 1 each 0    Jardiance 25 MG tablet tablet TAKE 1 TABLET BY MOUTH DAILY 90 tablet 1    Lancets (FREESTYLE) lancets Use to check blood glucose twice daily as needed DX: E11.65 100 each 11    levothyroxine (SYNTHROID, LEVOTHROID) 150 MCG tablet TAKE 1 TABLET BY MOUTH DAILY 90 tablet 1    metFORMIN (GLUCOPHAGE) 1000 MG tablet TAKE 1 TABLET BY MOUTH TWICE DAILY WITH MEALS 180 tablet 1    triamcinolone (KENALOG) 0.1 % cream Apply 1 application topically to the appropriate area as directed 2 (Two) Times a Day. 453.6 g 0     No current facility-administered medications on file prior to visit.       Review of Systems:  General: Denies fever, chills, fatigue, and weakness.  Eyes: Denies vision loss, blurry vision, and excessive redness.  ENT: Denies hearing issues and difficulty swallowing.  Cardiovascular: Denies palpitations, chest pain, or syncopal episodes.  Respiratory: Denies shortness of breath, wheezing, and coughing.  GI: Denies abdominal pain, nausea, and vomiting.   : Denies frequency, hematuria, and urgency.  Musculoskeletal: Denies muscle cramps, joint pains, and stiffness.  Derm: Denies rash, open wounds, or suspicious lesions. Positive for a cyst to the right  "3rd toe that is tender when enlarged.  Neuro: Denies headaches, numbness, loss of coordination, and tremors.  Psych: Denies anxiety and depression.  Endocrine: Denies temperature intolerance and changes in appetite.  Heme: Denies bleeding disorders or abnormal bruising.     Objective   Pulse 83   Ht 172.7 cm (68\")   Wt 78.9 kg (174 lb)   SpO2 98%   BMI 26.46 kg/mý     Foot/Ankle Exam    GENERAL  Diabetic foot exam performed    Orientation:  AAOx3  Affect:  appropriate    VASCULAR     Right Foot Vascularity   Normal vascular exam    Dorsalis pedis:  2+  Posterior tibial:  2+  Skin temperature:  warm  Edema grading:  None  CFT:  < 3 seconds  Pedal hair growth:  Present  Varicosities:  none     Left Foot Vascularity   Normal vascular exam    Dorsalis pedis:  2+  Posterior tibial:  2+  Skin temperature:  warm  Edema grading:  None  CFT:  < 3 seconds  Pedal hair growth:  Present  Varicosities:  none     NEUROLOGIC     Right Foot Neurologic   Light touch sensation: normal  Hot/Cold sensation: normal  Achilles reflex:  2+     Left Foot Neurologic   Light touch sensation: normal  Hot/Cold sensation:  normal  Achilles reflex:  2+    MUSCULOSKELETAL     Right Foot Musculoskeletal   Arch:  Normal     Left Foot Musculoskeletal   Arch:  Normal    MUSCLE STRENGTH     Right Foot Muscle Strength   Normal strength    Foot dorsiflexion:  5  Foot plantar flexion:  5  Foot inversion:  5  Foot eversion:  5     Left Foot Muscle Strength   Normal strength    Foot dorsiflexion:  5  Foot plantar flexion:  5  Foot inversion:  5  Foot eversion:  5    DERMATOLOGIC      Right Foot Dermatologic   Skin  Right foot skin is intact.   Nails comment:  Nails 1-5     Left Foot Dermatologic   Skin  Left foot skin is intact.   Nails comment:  Nails 1-5    TESTS     Right Foot Tests   Anterior drawer: negative  Varus tilt: negative     Left Foot Tests   Anterior drawer: negative  Varus tilt: negative     Right foot additional comments: No gross " deformity or instability involving the feet. No open wounds or signs of infection. Evidence of previously avulsed digital cyst involving the dorsal distal aspect of the right 3rd toe. No signs of infection or further concerns.    Assessment & Plan   Diagnoses and all orders for this visit:    1. Digital mucinous cyst of toe of right foot (Primary)  -     XR Foot 3+ View Right    2. Type 2 diabetes mellitus with hyperglycemia, without long-term current use of insulin      The patient presents to the office today for evaluation of issues involving his right foot. X-rays of the right foot will be obtained today. On physical examination, there is evidence of a previously avulsed digital cyst involving the dorsal distal aspect of the right 3rd toe. There are no signs of infection or further concerns. Diagnosis and treatment options were discussed at length with the patient. Explained the cyst does not require any treatment if it is not symptomatic. However, if his symptoms persist or worsen, the only definitive treatment is surgery. Surgical procedure, risks, goals, and recovery were discussed in detail with the patient. Explained surgery involving this area is slightly more complicated given the small size and location of the cyst. Explained there is a decent chance that, secondary to disruption of the nail bed, there can be an issue where there is no residual nail growth or there may be changes with how the nail wants to grow. Discussed surgical intervention to fuse the joint with hardware placement. Did discuss conservative management at home to deepthi and drain the cyst with a sterilized razor or needle. Do feel patient is at minimal risk for pedal complications related to diabetes mellitus type 2. Recommended the patient continue working with his primary care physician. The patient will return to the office in 2 months for re-evaluation. Greater than 30 minutes was spent before, during, and after evaluation for  patient care.    Orders Placed This Encounter   Procedures    XR Foot 3+ View Right     Weight Bearing  Patient can leave office after XR     Order Specific Question:   Reason for Exam:     Answer:   Digital mucinous cyst of right toe, room 13 WB     Order Specific Question:   Does this patient have a diabetic monitoring/medication delivering device on?     Answer:   No     Order Specific Question:   Release to patient     Answer:   Routine Release [[id number]]        Note is dictated utilizing voice recognition software. Unfortunately this leads to occasional typographical errors. I apologize in advance if the situation occurs. If questions occur please do not hesitate to call our office.    Transcribed from ambient dictation for CYN Bah DPM by Mari Hylton.  08/14/23   10:39 EDT    Patient or patient representative verbalized consent to the visit recording.  I have personally performed the services described in this document as transcribed by the above individual, and it is both accurate and complete.

## 2023-08-18 ENCOUNTER — PATIENT ROUNDING (BHMG ONLY) (OUTPATIENT)
Dept: PODIATRY | Facility: CLINIC | Age: 48
End: 2023-08-18
Payer: COMMERCIAL

## 2023-10-12 DIAGNOSIS — E11.65 TYPE 2 DIABETES MELLITUS WITH HYPERGLYCEMIA, WITHOUT LONG-TERM CURRENT USE OF INSULIN: ICD-10-CM

## 2023-10-12 DIAGNOSIS — E03.9 ACQUIRED HYPOTHYROIDISM: ICD-10-CM

## 2023-10-12 RX ORDER — LEVOTHYROXINE SODIUM 0.15 MG/1
150 TABLET ORAL DAILY
Qty: 90 TABLET | Refills: 1 | Status: SHIPPED | OUTPATIENT
Start: 2023-10-12

## 2023-10-12 RX ORDER — EMPAGLIFLOZIN 25 MG/1
TABLET, FILM COATED ORAL
Qty: 90 TABLET | Refills: 1 | Status: SHIPPED | OUTPATIENT
Start: 2023-10-12

## 2023-10-12 RX ORDER — EMPAGLIFLOZIN 25 MG/1
TABLET, FILM COATED ORAL
Qty: 90 TABLET | Refills: 1 | OUTPATIENT
Start: 2023-10-12

## 2023-11-09 DIAGNOSIS — E03.9 ACQUIRED HYPOTHYROIDISM: ICD-10-CM

## 2023-11-09 DIAGNOSIS — E11.65 TYPE 2 DIABETES MELLITUS WITH HYPERGLYCEMIA, WITHOUT LONG-TERM CURRENT USE OF INSULIN: Primary | ICD-10-CM

## 2023-11-15 ENCOUNTER — CLINICAL SUPPORT (OUTPATIENT)
Dept: FAMILY MEDICINE CLINIC | Facility: CLINIC | Age: 48
End: 2023-11-15
Payer: COMMERCIAL

## 2023-11-15 DIAGNOSIS — E11.65 TYPE 2 DIABETES MELLITUS WITH HYPERGLYCEMIA, WITHOUT LONG-TERM CURRENT USE OF INSULIN: Primary | ICD-10-CM

## 2023-11-15 DIAGNOSIS — E78.2 MULTIPLE-TYPE HYPERLIPIDEMIA: ICD-10-CM

## 2023-11-15 LAB
DEPRECATED RDW RBC AUTO: 39.4 FL (ref 37–54)
ERYTHROCYTE [DISTWIDTH] IN BLOOD BY AUTOMATED COUNT: 12.3 % (ref 12.3–15.4)
HBA1C MFR BLD: 6.1 % (ref 4.8–5.6)
HCT VFR BLD AUTO: 46 % (ref 37.5–51)
HGB BLD-MCNC: 15.3 G/DL (ref 13–17.7)
MCH RBC QN AUTO: 29 PG (ref 26.6–33)
MCHC RBC AUTO-ENTMCNC: 33.3 G/DL (ref 31.5–35.7)
MCV RBC AUTO: 87.3 FL (ref 79–97)
PLATELET # BLD AUTO: 244 10*3/MM3 (ref 140–450)
PMV BLD AUTO: 11.2 FL (ref 6–12)
RBC # BLD AUTO: 5.27 10*6/MM3 (ref 4.14–5.8)
WBC NRBC COR # BLD: 6.83 10*3/MM3 (ref 3.4–10.8)

## 2023-11-15 PROCEDURE — 83036 HEMOGLOBIN GLYCOSYLATED A1C: CPT | Performed by: NURSE PRACTITIONER

## 2023-11-15 PROCEDURE — 80061 LIPID PANEL: CPT | Performed by: NURSE PRACTITIONER

## 2023-11-15 PROCEDURE — 36415 COLL VENOUS BLD VENIPUNCTURE: CPT | Performed by: NURSE PRACTITIONER

## 2023-11-15 PROCEDURE — 80053 COMPREHEN METABOLIC PANEL: CPT | Performed by: NURSE PRACTITIONER

## 2023-11-15 PROCEDURE — 85027 COMPLETE CBC AUTOMATED: CPT | Performed by: NURSE PRACTITIONER

## 2023-11-15 PROCEDURE — 82043 UR ALBUMIN QUANTITATIVE: CPT | Performed by: NURSE PRACTITIONER

## 2023-11-15 NOTE — PROGRESS NOTES
Venipuncture Blood Specimen Collection  Venipuncture performed in the right arm by Suma Tomlinson MA with good hemostasis. Patient tolerated the procedure well without complications.   11/15/23   Suma Tomlinson MA

## 2023-11-16 LAB
ALBUMIN SERPL-MCNC: 4.9 G/DL (ref 3.5–5.2)
ALBUMIN UR-MCNC: <1.2 MG/DL
ALBUMIN/GLOB SERPL: 2.1 G/DL
ALP SERPL-CCNC: 34 U/L (ref 39–117)
ALT SERPL W P-5'-P-CCNC: 30 U/L (ref 1–41)
ANION GAP SERPL CALCULATED.3IONS-SCNC: 8 MMOL/L (ref 5–15)
AST SERPL-CCNC: 26 U/L (ref 1–40)
BILIRUB SERPL-MCNC: 0.7 MG/DL (ref 0–1.2)
BUN SERPL-MCNC: 9 MG/DL (ref 6–20)
BUN/CREAT SERPL: 11.3 (ref 7–25)
CALCIUM SPEC-SCNC: 9.3 MG/DL (ref 8.6–10.5)
CHLORIDE SERPL-SCNC: 103 MMOL/L (ref 98–107)
CHOLEST SERPL-MCNC: 229 MG/DL (ref 0–200)
CO2 SERPL-SCNC: 28 MMOL/L (ref 22–29)
CREAT SERPL-MCNC: 0.8 MG/DL (ref 0.76–1.27)
EGFRCR SERPLBLD CKD-EPI 2021: 109.8 ML/MIN/1.73
GLOBULIN UR ELPH-MCNC: 2.3 GM/DL
GLUCOSE SERPL-MCNC: 126 MG/DL (ref 65–99)
HDLC SERPL-MCNC: 69 MG/DL (ref 40–60)
LDLC SERPL CALC-MCNC: 141 MG/DL (ref 0–100)
LDLC/HDLC SERPL: 2.01 {RATIO}
POTASSIUM SERPL-SCNC: 4.6 MMOL/L (ref 3.5–5.2)
PROT SERPL-MCNC: 7.2 G/DL (ref 6–8.5)
SODIUM SERPL-SCNC: 139 MMOL/L (ref 136–145)
TRIGL SERPL-MCNC: 106 MG/DL (ref 0–150)
VLDLC SERPL-MCNC: 19 MG/DL (ref 5–40)

## 2023-11-21 ENCOUNTER — OFFICE VISIT (OUTPATIENT)
Dept: FAMILY MEDICINE CLINIC | Facility: CLINIC | Age: 48
End: 2023-11-21
Payer: COMMERCIAL

## 2023-11-21 VITALS
RESPIRATION RATE: 18 BRPM | SYSTOLIC BLOOD PRESSURE: 116 MMHG | HEART RATE: 86 BPM | BODY MASS INDEX: 26.95 KG/M2 | OXYGEN SATURATION: 98 % | HEIGHT: 68 IN | WEIGHT: 177.8 LBS | TEMPERATURE: 98.6 F | DIASTOLIC BLOOD PRESSURE: 70 MMHG

## 2023-11-21 DIAGNOSIS — E11.9 TYPE 2 DIABETES MELLITUS WITHOUT COMPLICATION, WITHOUT LONG-TERM CURRENT USE OF INSULIN: Primary | ICD-10-CM

## 2023-11-21 DIAGNOSIS — E03.9 ACQUIRED HYPOTHYROIDISM: ICD-10-CM

## 2023-11-21 DIAGNOSIS — R06.83 SNORING: ICD-10-CM

## 2023-11-21 DIAGNOSIS — E78.2 MULTIPLE-TYPE HYPERLIPIDEMIA: ICD-10-CM

## 2023-11-21 DIAGNOSIS — Z86.69 HISTORY OF OBSTRUCTIVE SLEEP APNEA: ICD-10-CM

## 2023-11-21 DIAGNOSIS — Z23 NEED FOR INFLUENZA VACCINATION: ICD-10-CM

## 2023-11-21 DIAGNOSIS — G47.09 OTHER INSOMNIA: ICD-10-CM

## 2023-11-21 NOTE — PROGRESS NOTES
Chief Complaint  Chief Complaint   Patient presents with    Diabetes     Home blood sugar 100-110. Eye exam not up to date.    Hypothyroidism    Sleeping Problem     Onset x 3 weeks. Able to fall asleep but wakes up early am and trouble falling back asleep. Has done a lot of traveling recently within multiple time zones.           Subjective          Naveen Zapata presents to [facility] for   History of Present Illness    Hypothyroidism, stable on medication, denies symptoms of constipation, weight gain/loss, hot or cold intolerance, hair loss, abnormal heart rate and fatigue.     Diabetes mellitus type II, feels stable on meds, denies any signs/symptoms of hyper/hypoglycemia, blurry vision, polydipsia, polyuria, nocturia, and unintentional weight loss    He reports snoring, no gasping, had sleep study about 15 years ago +YOEL, was on cpap and stopped using it, he tolerated it, just stopped it. He reports difficulty sleeping, wakes and can not fall back to sleep.     Here to review labs      The following portions of the patient's history were reviewed and updated as appropriate: allergies, current medications, past family history, past medical history, past social history, past surgical history and problem list.    Past Medical History:   Diagnosis Date    History of echocardiogram 10/23/2019    History of stress test 10/24/2019    Hypothyroidism     Syncope and collapse     Type 2 diabetes mellitus      Past Surgical History:   Procedure Laterality Date    SHOULDER ARTHROSCOPY Right 10/02/2020     Family History   Problem Relation Age of Onset    Diabetes Mother     Heart disease Mother     Diabetes Father     Heart attack Father     Stroke Father     Diabetes Maternal Grandmother     Diabetes Maternal Grandfather     Diabetes Paternal Grandmother     Diabetes Paternal Grandfather      Social History     Tobacco Use    Smoking status: Former     Packs/day: 0.60     Years: 20.00      "Additional pack years: 0.00     Total pack years: 12.00     Types: Cigarettes     Start date: 1993     Quit date: 2013     Years since quitting: 10.8    Smokeless tobacco: Never    Tobacco comments:     Patient states he smoked 1 pack per month   Substance Use Topics    Alcohol use: No       Current Outpatient Medications:     glucose blood (FREESTYLE TEST STRIPS) test strip, Use to check blood glucose twice daily as needed DX: E11.65, Disp: 100 each, Rfl: 11    glucose monitoring kit (FREESTYLE) monitoring kit, Please provide glucometer on patient's formulary to check blood glucose twice daily. DX: E11.65, Disp: 1 each, Rfl: 0    Jardiance 25 MG tablet tablet, TAKE 1 TABLET BY MOUTH DAILY, Disp: 90 tablet, Rfl: 1    Lancets (FREESTYLE) lancets, Use to check blood glucose twice daily as needed DX: E11.65, Disp: 100 each, Rfl: 11    levothyroxine (SYNTHROID, LEVOTHROID) 150 MCG tablet, TAKE 1 TABLET BY MOUTH DAILY, Disp: 90 tablet, Rfl: 1    metFORMIN (GLUCOPHAGE) 1000 MG tablet, TAKE 1 TABLET BY MOUTH TWICE DAILY WITH MEALS, Disp: 180 tablet, Rfl: 1    triamcinolone (KENALOG) 0.1 % cream, Apply 1 application topically to the appropriate area as directed 2 (Two) Times a Day., Disp: 453.6 g, Rfl: 0    Objective   Vital Signs:   /70 (BP Location: Left arm, Patient Position: Sitting, Cuff Size: Adult)   Pulse 86   Temp 98.6 °F (37 °C) (Oral)   Resp 18   Ht 172.7 cm (68\")   Wt 80.6 kg (177 lb 12.8 oz)   SpO2 98% Comment: Room air  BMI 27.03 kg/m²           Physical Exam  Constitutional:       General: He is not in acute distress.     Appearance: Normal appearance. He is well-developed. He is not ill-appearing or diaphoretic.   HENT:      Head: Normocephalic.   Eyes:      Conjunctiva/sclera: Conjunctivae normal.      Pupils: Pupils are equal, round, and reactive to light.   Neck:      Thyroid: No thyromegaly.      Vascular: No JVD.   Cardiovascular:      Rate and Rhythm: Normal rate and regular rhythm.      " Heart sounds: Normal heart sounds. No murmur heard.  Pulmonary:      Effort: Pulmonary effort is normal. No respiratory distress.      Breath sounds: Normal breath sounds. No wheezing or rhonchi.   Abdominal:      General: Bowel sounds are normal. There is no distension.      Palpations: Abdomen is soft.      Tenderness: There is no abdominal tenderness.   Musculoskeletal:         General: No swelling or tenderness. Normal range of motion.      Cervical back: Normal range of motion and neck supple. No tenderness.   Lymphadenopathy:      Cervical: No cervical adenopathy.   Skin:     General: Skin is warm and dry.      Coloration: Skin is not jaundiced.      Findings: No erythema or rash.   Neurological:      General: No focal deficit present.      Mental Status: He is alert and oriented to person, place, and time. Mental status is at baseline.      Sensory: No sensory deficit.   Psychiatric:         Mood and Affect: Mood normal.         Behavior: Behavior normal.         Thought Content: Thought content normal.         Judgment: Judgment normal.          Result Review :     No visits with results within 7 Day(s) from this visit.   Latest known visit with results is:   Orders Only on 11/09/2023   Component Date Value Ref Range Status    WBC 11/15/2023 6.83  3.40 - 10.80 10*3/mm3 Final    RBC 11/15/2023 5.27  4.14 - 5.80 10*6/mm3 Final    Hemoglobin 11/15/2023 15.3  13.0 - 17.7 g/dL Final    Hematocrit 11/15/2023 46.0  37.5 - 51.0 % Final    MCV 11/15/2023 87.3  79.0 - 97.0 fL Final    MCH 11/15/2023 29.0  26.6 - 33.0 pg Final    MCHC 11/15/2023 33.3  31.5 - 35.7 g/dL Final    RDW 11/15/2023 12.3  12.3 - 15.4 % Final    RDW-SD 11/15/2023 39.4  37.0 - 54.0 fl Final    MPV 11/15/2023 11.2  6.0 - 12.0 fL Final    Platelets 11/15/2023 244  140 - 450 10*3/mm3 Final    Glucose 11/15/2023 126 (H)  65 - 99 mg/dL Final    BUN 11/15/2023 9  6 - 20 mg/dL Final    Creatinine 11/15/2023 0.80  0.76 - 1.27 mg/dL Final    Sodium  11/15/2023 139  136 - 145 mmol/L Final    Potassium 11/15/2023 4.6  3.5 - 5.2 mmol/L Final    Chloride 11/15/2023 103  98 - 107 mmol/L Final    CO2 11/15/2023 28.0  22.0 - 29.0 mmol/L Final    Calcium 11/15/2023 9.3  8.6 - 10.5 mg/dL Final    Total Protein 11/15/2023 7.2  6.0 - 8.5 g/dL Final    Albumin 11/15/2023 4.9  3.5 - 5.2 g/dL Final    ALT (SGPT) 11/15/2023 30  1 - 41 U/L Final    AST (SGOT) 11/15/2023 26  1 - 40 U/L Final    Alkaline Phosphatase 11/15/2023 34 (L)  39 - 117 U/L Final    Total Bilirubin 11/15/2023 0.7  0.0 - 1.2 mg/dL Final    Globulin 11/15/2023 2.3  gm/dL Final    A/G Ratio 11/15/2023 2.1  g/dL Final    BUN/Creatinine Ratio 11/15/2023 11.3  7.0 - 25.0 Final    Anion Gap 11/15/2023 8.0  5.0 - 15.0 mmol/L Final    eGFR 11/15/2023 109.8  >60.0 mL/min/1.73 Final    Total Cholesterol 11/15/2023 229 (H)  0 - 200 mg/dL Final    Triglycerides 11/15/2023 106  0 - 150 mg/dL Final    HDL Cholesterol 11/15/2023 69 (H)  40 - 60 mg/dL Final    LDL Cholesterol  11/15/2023 141 (H)  0 - 100 mg/dL Final    VLDL Cholesterol 11/15/2023 19  5 - 40 mg/dL Final    LDL/HDL Ratio 11/15/2023 2.01   Final    Hemoglobin A1C 11/15/2023 6.10 (H)  4.80 - 5.60 % Final    Microalbumin, Urine 11/15/2023 <1.2  mg/dL Final                              Assessment and Plan    Diagnoses and all orders for this visit:    1. Type 2 diabetes mellitus without complication, without long-term current use of insulin (Primary)    2. Multiple-type hyperlipidemia    3. Acquired hypothyroidism    4. Need for influenza vaccination  -     Fluzone (or Fluarix & Flulaval for VFC) >6 Mos ([id number])    5. Snoring  -     Home Sleep Study; Future    6. History of obstructive sleep apnea  Comments:  recheck sleep study  Orders:  -     Home Sleep Study; Future    7. Other insomnia  Comments:  rec trial of melatonin prn  check home sleep study  Orders:  -     Home Sleep Study; Future      Conditions stable  No medication refills needed at this  time  Continue current medication regimen  Labs reviewed and essentially stable        I spent 30 minutes caring for Naveen Zapata on this date of service. This time includes time spent by me in the following activities: preparing for the visit, reviewing tests, performing a medically appropriate examination and/or evaluation , counseling and educating the patient/family/caregiver, ordering medications, tests, or procedures and documenting information in the medical record        Follow Up     Return in 6 months (on 5/21/2024) for Annual physical. DM panel and TSH prior to appt.  Patient was given instructions and counseling regarding his condition or for health maintenance advice. Please see specific information pulled into the AVS if appropriate.        Part of this note may be an electronic transcription/translation of spoken language to printed text using the Dragon Dictation System

## 2023-12-22 ENCOUNTER — TELEPHONE (OUTPATIENT)
Dept: FAMILY MEDICINE CLINIC | Facility: CLINIC | Age: 48
End: 2023-12-22

## 2023-12-22 NOTE — TELEPHONE ENCOUNTER
Caller: Naveen Zapata    Relationship: Self    Best call back number: [phone]     What test was performed: [facility] STUDY    When was the test performed: 12/13/2023    Where was the test performed: AT HOME

## 2023-12-27 DIAGNOSIS — G47.33 OSA (OBSTRUCTIVE SLEEP APNEA): Primary | ICD-10-CM

## 2024-03-13 ENCOUNTER — TELEPHONE (OUTPATIENT)
Dept: FAMILY MEDICINE CLINIC | Facility: CLINIC | Age: 49
End: 2024-03-13
Payer: COMMERCIAL

## 2024-03-22 ENCOUNTER — HOSPITAL ENCOUNTER (EMERGENCY)
Facility: HOSPITAL | Age: 49
Discharge: HOME | End: 2024-03-22
Attending: EMERGENCY MEDICINE
Payer: COMMERCIAL

## 2024-03-22 ENCOUNTER — APPOINTMENT (OUTPATIENT)
Dept: CT IMAGING | Facility: HOSPITAL | Age: 49
End: 2024-03-22
Payer: COMMERCIAL

## 2024-03-22 ENCOUNTER — TELEPHONE (OUTPATIENT)
Dept: FAMILY MEDICINE CLINIC | Facility: CLINIC | Age: 49
End: 2024-03-22
Payer: COMMERCIAL

## 2024-03-22 VITALS
SYSTOLIC BLOOD PRESSURE: 151 MMHG | HEART RATE: 63 BPM | HEIGHT: 67 IN | WEIGHT: 174.7 LBS | OXYGEN SATURATION: 99 % | TEMPERATURE: 97.4 F | BODY MASS INDEX: 27.42 KG/M2 | RESPIRATION RATE: 14 BRPM | DIASTOLIC BLOOD PRESSURE: 84 MMHG

## 2024-03-22 DIAGNOSIS — K80.50 BILIARY COLIC SYMPTOM: ICD-10-CM

## 2024-03-22 DIAGNOSIS — R10.11 RIGHT UPPER QUADRANT ABDOMINAL PAIN: Primary | ICD-10-CM

## 2024-03-22 DIAGNOSIS — K76.89 HEPATIC CYST: ICD-10-CM

## 2024-03-22 LAB
ALBUMIN SERPL-MCNC: 4.7 G/DL (ref 3.5–5.2)
ALBUMIN/GLOB SERPL: 1.6 G/DL
ALP SERPL-CCNC: 42 U/L (ref 39–117)
ALT SERPL W P-5'-P-CCNC: 22 U/L (ref 1–41)
ANION GAP SERPL CALCULATED.3IONS-SCNC: 10.4 MMOL/L (ref 5–15)
AST SERPL-CCNC: 19 U/L (ref 1–40)
BASOPHILS # BLD AUTO: 0.03 10*3/MM3 (ref 0–0.2)
BASOPHILS NFR BLD AUTO: 0.5 % (ref 0–1.5)
BILIRUB SERPL-MCNC: 0.9 MG/DL (ref 0–1.2)
BUN SERPL-MCNC: 12 MG/DL (ref 6–20)
BUN/CREAT SERPL: 16.9 (ref 7–25)
CALCIUM SPEC-SCNC: 9.5 MG/DL (ref 8.6–10.5)
CHLORIDE SERPL-SCNC: 99 MMOL/L (ref 98–107)
CO2 SERPL-SCNC: 26.6 MMOL/L (ref 22–29)
CREAT SERPL-MCNC: 0.71 MG/DL (ref 0.76–1.27)
DEPRECATED RDW RBC AUTO: 41.4 FL (ref 37–54)
EGFRCR SERPLBLD CKD-EPI 2021: 113.9 ML/MIN/1.73
EOSINOPHIL # BLD AUTO: 0.11 10*3/MM3 (ref 0–0.4)
EOSINOPHIL NFR BLD AUTO: 1.8 % (ref 0.3–6.2)
ERYTHROCYTE [DISTWIDTH] IN BLOOD BY AUTOMATED COUNT: 12.5 % (ref 12.3–15.4)
GLOBULIN UR ELPH-MCNC: 2.9 GM/DL
GLUCOSE SERPL-MCNC: 93 MG/DL (ref 65–99)
HCT VFR BLD AUTO: 47.7 % (ref 37.5–51)
HGB BLD-MCNC: 15.4 G/DL (ref 13–17.7)
IMM GRANULOCYTES # BLD AUTO: 0.01 10*3/MM3 (ref 0–0.05)
IMM GRANULOCYTES NFR BLD AUTO: 0.2 % (ref 0–0.5)
LIPASE SERPL-CCNC: 38 U/L (ref 13–60)
LYMPHOCYTES # BLD AUTO: 2.5 10*3/MM3 (ref 0.7–3.1)
LYMPHOCYTES NFR BLD AUTO: 40.1 % (ref 19.6–45.3)
MCH RBC QN AUTO: 28.7 PG (ref 26.6–33)
MCHC RBC AUTO-ENTMCNC: 32.3 G/DL (ref 31.5–35.7)
MCV RBC AUTO: 89 FL (ref 79–97)
MONOCYTES # BLD AUTO: 0.55 10*3/MM3 (ref 0.1–0.9)
MONOCYTES NFR BLD AUTO: 8.8 % (ref 5–12)
NEUTROPHILS NFR BLD AUTO: 3.03 10*3/MM3 (ref 1.7–7)
NEUTROPHILS NFR BLD AUTO: 48.6 % (ref 42.7–76)
PLATELET # BLD AUTO: 236 10*3/MM3 (ref 140–450)
PMV BLD AUTO: 10.1 FL (ref 6–12)
POTASSIUM SERPL-SCNC: 3.8 MMOL/L (ref 3.5–5.2)
PROT SERPL-MCNC: 7.6 G/DL (ref 6–8.5)
RBC # BLD AUTO: 5.36 10*6/MM3 (ref 4.14–5.8)
SODIUM SERPL-SCNC: 136 MMOL/L (ref 136–145)
TROPONIN T SERPL HS-MCNC: <6 NG/L
WBC NRBC COR # BLD AUTO: 6.23 10*3/MM3 (ref 3.4–10.8)

## 2024-03-22 PROCEDURE — 85025 COMPLETE CBC W/AUTO DIFF WBC: CPT | Performed by: NURSE PRACTITIONER

## 2024-03-22 PROCEDURE — 25010000002 ONDANSETRON PER 1 MG: Performed by: NURSE PRACTITIONER

## 2024-03-22 PROCEDURE — 93005 ELECTROCARDIOGRAM TRACING: CPT | Performed by: NURSE PRACTITIONER

## 2024-03-22 PROCEDURE — 83690 ASSAY OF LIPASE: CPT | Performed by: NURSE PRACTITIONER

## 2024-03-22 PROCEDURE — 74177 CT ABD & PELVIS W/CONTRAST: CPT

## 2024-03-22 PROCEDURE — 84484 ASSAY OF TROPONIN QUANT: CPT | Performed by: NURSE PRACTITIONER

## 2024-03-22 PROCEDURE — 99284 EMERGENCY DEPT VISIT MOD MDM: CPT | Performed by: NURSE PRACTITIONER

## 2024-03-22 PROCEDURE — 93010 ELECTROCARDIOGRAM REPORT: CPT | Performed by: EMERGENCY MEDICINE

## 2024-03-22 PROCEDURE — 99285 EMERGENCY DEPT VISIT HI MDM: CPT

## 2024-03-22 PROCEDURE — 25510000001 IOPAMIDOL PER 1 ML: Performed by: EMERGENCY MEDICINE

## 2024-03-22 PROCEDURE — 96374 THER/PROPH/DIAG INJ IV PUSH: CPT

## 2024-03-22 PROCEDURE — 80053 COMPREHEN METABOLIC PANEL: CPT | Performed by: NURSE PRACTITIONER

## 2024-03-22 PROCEDURE — 25810000003 SODIUM CHLORIDE 0.9 % SOLUTION: Performed by: NURSE PRACTITIONER

## 2024-03-22 RX ORDER — DICYCLOMINE HCL 20 MG
20 TABLET ORAL EVERY 6 HOURS PRN
Qty: 30 TABLET | Refills: 0 | Status: SHIPPED | OUTPATIENT
Start: 2024-03-22

## 2024-03-22 RX ORDER — SODIUM CHLORIDE 0.9 % (FLUSH) 0.9 %
10 SYRINGE (ML) INJECTION AS NEEDED
Status: DISCONTINUED | OUTPATIENT
Start: 2024-03-22 | End: 2024-03-22 | Stop reason: HOSPADM

## 2024-03-22 RX ORDER — ONDANSETRON 2 MG/ML
4 INJECTION INTRAMUSCULAR; INTRAVENOUS ONCE
Status: COMPLETED | OUTPATIENT
Start: 2024-03-22 | End: 2024-03-22

## 2024-03-22 RX ADMIN — IOPAMIDOL 100 ML: 755 INJECTION, SOLUTION INTRAVENOUS at 14:05

## 2024-03-22 RX ADMIN — SODIUM CHLORIDE 1000 ML: 9 INJECTION, SOLUTION INTRAVENOUS at 13:14

## 2024-03-22 RX ADMIN — ONDANSETRON 4 MG: 2 INJECTION INTRAMUSCULAR; INTRAVENOUS at 13:15

## 2024-03-22 NOTE — [facility] PROVIDER NOTE
EMERGENCY DEPARTMENT ENCOUNTER    Room Number:  05/05  Date seen:  3/22/2024  Time seen: 12:55 EDT  PCP: Jessica Inman APRN  Historian: patient    Discussed/obtained information from independent historians: n/a    HPI:  Chief complaint:abd pain  A complete HPI/ROS/PMH/PSH/SH/FH are unobtainable due to:   Context:Naveen Zapata is a 48 y.o. male with a past medical history of diabetes, hypothyroid who presents to the ED with c/o 3 days of right upper quadrant abdominal pain described as bloating, intermittent and squeezing.  Does radiate to the right shoulder at times.  He reports increased dyspepsia and nausea but denies vomiting or change in bowel habits.  He has not tried anything for the pain.  It is not made better or worse by anything especially eating certain foods.  He has not had this problem before.    External (non-ED) record review:  I reviewed last PCP office visit dated 11/21/23.      Chronic or social conditions impacting care:n/a    ALLERGIES  Patient has no known allergies.    PAST MEDICAL HISTORY  Active Ambulatory Problems     Diagnosis Date Noted    Diabetes mellitus, type 2 11/01/2016    Encounter for immunization 12/19/2018    Hypothyroidism 11/01/2016    Multiple-type hyperlipidemia 12/19/2018    History of stress test 10/24/2019    History of echocardiogram 10/23/2019    Syncope and collapse      Resolved Ambulatory Problems     Diagnosis Date Noted    No Resolved Ambulatory Problems     Past Medical History:   Diagnosis Date    Type 2 diabetes mellitus        PAST SURGICAL HISTORY  Past Surgical History:   Procedure Laterality Date    SHOULDER ARTHROSCOPY Right 10/02/2020       FAMILY HISTORY  Family History   Problem Relation Age of Onset    Diabetes Mother     Heart disease Mother     Diabetes Father     Heart attack Father     Stroke Father     Diabetes Maternal Grandmother     Diabetes Maternal Grandfather     Diabetes Paternal Grandmother     Diabetes Paternal Grandfather         SOCIAL HISTORY  Social History     Socioeconomic History    Marital status:    Tobacco Use    Smoking status: Former     Current packs/day: 0.00     Average packs/day: 0.6 packs/day for 20.0 years (12.0 ttl pk-yrs)     Types: Cigarettes     Start date:      Quit date: 2013     Years since quittin.2    Smokeless tobacco: Never    Tobacco comments:     Patient states he smoked 1 pack per month   Vaping Use    Vaping status: Never Used   Substance and Sexual Activity    Alcohol use: No    Drug use: No    Sexual activity: Defer       REVIEW OF SYSTEMS  Review of Systems    All systems reviewed and negative except for those discussed in HPI.     PHYSICAL EXAM    I have reviewed the triage vital signs and nursing notes.  Vitals:    24 1251   BP: 151/84   Pulse: 63   Resp: 14   Temp: 97.4 °F (36.3 °C)   SpO2: 99%     Physical Exam    GENERAL: not distressed  HENT: nares patent, mm moist  EYES: no scleral icterus  NECK: no ROM limitations  CV: regular rhythm, regular rate, no murmur  RESPIRATORY: normal effort, CTAB  ABDOMEN: soft, subtle RUQ tenderness. Mild periumbilical and RLQ tenderness.  No rebound, guarding or rigidity.   : deferred  MUSCULOSKELETAL: no deformity  NEURO: alert, moves all extremities, follows commands  SKIN: warm, dry    LAB RESULTS  Recent Results (from the past 24 hour(s))   Comprehensive Metabolic Panel    Collection Time: 24  1:10 PM    Specimen: Blood   Result Value Ref Range    Glucose 93 65 - 99 mg/dL    BUN 12 6 - 20 mg/dL    Creatinine 0.71 (L) 0.76 - 1.27 mg/dL    Sodium 136 136 - 145 mmol/L    Potassium 3.8 3.5 - 5.2 mmol/L    Chloride 99 98 - 107 mmol/L    CO2 26.6 22.0 - 29.0 mmol/L    Calcium 9.5 8.6 - 10.5 mg/dL    Total Protein 7.6 6.0 - 8.5 g/dL    Albumin 4.7 3.5 - 5.2 g/dL    ALT (SGPT) 22 1 - 41 U/L    AST (SGOT) 19 1 - 40 U/L    Alkaline Phosphatase 42 39 - 117 U/L    Total Bilirubin 0.9 0.0 - 1.2 mg/dL    Globulin 2.9 gm/dL    A/G Ratio 1.6  g/dL    BUN/Creatinine Ratio 16.9 7.0 - 25.0    Anion Gap 10.4 5.0 - 15.0 mmol/L    eGFR 113.9 >60.0 mL/min/1.73   Lipase    Collection Time: 03/22/24  1:10 PM    Specimen: Blood   Result Value Ref Range    Lipase 38 13 - 60 U/L   CBC Auto Differential    Collection Time: 03/22/24  1:10 PM    Specimen: Blood   Result Value Ref Range    WBC 6.23 3.40 - 10.80 10*3/mm3    RBC 5.36 4.14 - 5.80 10*6/mm3    Hemoglobin 15.4 13.0 - 17.7 g/dL    Hematocrit 47.7 37.5 - 51.0 %    MCV 89.0 79.0 - 97.0 fL    MCH 28.7 26.6 - 33.0 pg    MCHC 32.3 31.5 - 35.7 g/dL    RDW 12.5 12.3 - 15.4 %    RDW-SD 41.4 37.0 - 54.0 fl    MPV 10.1 6.0 - 12.0 fL    Platelets 236 140 - 450 10*3/mm3    Neutrophil % 48.6 42.7 - 76.0 %    Lymphocyte % 40.1 19.6 - 45.3 %    Monocyte % 8.8 5.0 - 12.0 %    Eosinophil % 1.8 0.3 - 6.2 %    Basophil % 0.5 0.0 - 1.5 %    Immature Grans % 0.2 0.0 - 0.5 %    Neutrophils, Absolute 3.03 1.70 - 7.00 10*3/mm3    Lymphocytes, Absolute 2.50 0.70 - 3.10 10*3/mm3    Monocytes, Absolute 0.55 0.10 - 0.90 10*3/mm3    Eosinophils, Absolute 0.11 0.00 - 0.40 10*3/mm3    Basophils, Absolute 0.03 0.00 - 0.20 10*3/mm3    Immature Grans, Absolute 0.01 0.00 - 0.05 10*3/mm3   ECG 12 Lead Chest Pain    Collection Time: 03/22/24  1:18 PM   Result Value Ref Range    QT Interval 404 ms    QTC Interval 409 ms   Single High Sensitivity Troponin T    Collection Time: 03/22/24  2:25 PM    Specimen: Blood   Result Value Ref Range    HS Troponin T <6 <22 ng/L       Ordered the above labs and independently interpreted results.  My findings will be discussed in the ED course or medical decision making section below    RADIOLOGY RESULTS  CT Abdomen Pelvis With Contrast    Result Date: 3/22/2024  CT ABDOMEN PELVIS W CONTRAST Date of Exam: 3/22/2024 1:51 PM EDT Indication: RUQ and periumbilical pain. Comparison: None available. Technique: Axial CT images were obtained of the abdomen and pelvis following the uneventful intravenous administration  of 100 cc Isovue-370. Sagittal and coronal reconstructions were performed.  Automated exposure control and iterative reconstruction methods were used. Findings: LUNG BASES: Pelvis with the LIVER: There is a 9 mm cyst within the anterior segment of the right hepatic lobe. BILIARY/GALLBLADDER:  Unremarkable SPLEEN:  Unremarkable PANCREAS:  Unremarkable ADRENAL:  Unremarkable KIDNEYS:  Unremarkable parenchyma with no solid mass identified. No obstruction.  No calculus identified. GASTROINTESTINAL/MESENTERY:  No evidence of obstruction nor inflammation.  The appendix is normal. MESENTERIC VESSELS:  Patent. AORTA/IVC:  Normal caliber. RETROPERITONEUM/LYMPH NODES:  Unremarkable OSSEUS STRUCTURES:  Typical for age with no acute process identified.     Impression: No acute process identified. Electronically Signed: Zay Jones MD  3/22/2024 2:10 PM EDT  Workstation ID: [id number]      Ordered the above noted radiological studies.  Independently interpreted by me.  My findings will be discussed in the medical decision section below.     PROGRESS, DATA ANALYSIS, CONSULTS AND MEDICAL DECISION MAKING    Please note that this section constitutes my independent interpretation of clinical data including lab results, radiology, EKG's.  This constitutes my independent professional opinion regarding differential diagnosis and management of this patient.  It may include any factors such as history from outside sources, review of external records, social determinants of health, management of medications, response to those treatments, and discussions with other providers.    ED Course as of 03/22/24 1523   Fri Mar 22, 2024   1310 Discussed the plan with patient and his partner to check labs and a CT scan.  I have ordered fluids and Zofran. [EW]   1343 AST (SGOT): 19 [EW]   1343 ALT (SGPT): 22 [EW]   1343 Lipase: 38 [EW]   1343 WBC: 6.23 [EW]   1423 EKG          EKG time: 1318  Rhythm/Rate: 61, sinus rhythm  P waves and WA: normal WA,  normal EDWIN  QRS, axis: normal QRS and axis  ST and T waves: mild MASON, not global.  Could be early repolarization    Interpreted Contemporaneously by me, independently viewed  Compared with prior dated 9/24/2020.  Rate rhythm similar.  He did have some mild ST elevation considered likely early repolarization which is demonstrated again today.   [EW]   1443 HS Troponin T: <6 [EW]      ED Course User Index  [EW] Jabier KaelynGUSTABO Nuñez     Orders placed during this visit:  Orders Placed This Encounter   Procedures    CT Abdomen Pelvis With Contrast    Comprehensive Metabolic Panel    Lipase    CBC Auto Differential    Single High Sensitivity Troponin T    ECG 12 Lead Chest Pain    Blood Draw With IV Start    Insert peripheral IV    CBC & Differential    ED Acknowledgement Form Needed;            Medical Decision Making  Problems Addressed:  Biliary colic symptom: complicated acute illness or injury  Hepatic cyst, seen on imaging: complicated acute illness or injury  Right upper quadrant abdominal pain: complicated acute illness or injury    Amount and/or Complexity of Data Reviewed  Labs: ordered. Decision-making details documented in ED Course.  Radiology: ordered.  ECG/medicine tests: ordered.    Risk  Prescription drug management.      DDX considered for this patient are GERD, Gastritis, cholelithiasis, cholecystitis, appendicitis, renal stone. The patient presents with 3 days of bloating and abdominal discomfort with nausea, no vomiting or diarrhea.  Liver enzymes, troponin and lipase normal.  EKG similar to prior and reassuring; troponin negative.  I do not suspect ACS or PE.  CT imaging shows no acute abnormalities involving gallbladder and normal appendix.  Hepatic cyst mentioned and I discussed with patient this is normal variant seen frequently.  Will have patient treat symptoms with gas x,and/ or bentyl. He has a reassuring abdominal exam.         DIAGNOSIS  Final diagnoses:   Right upper quadrant abdominal  pain   Biliary colic symptom   Hepatic cyst, seen on imaging          Medication List        New Prescriptions      dicyclomine 20 MG tablet  Commonly known as: BENTYL  Take 1 tablet by mouth Every 6 (Six) Hours As Needed for Abdominal Cramping.               Where to Get Your Medications        These medications were sent to [id number] - [place], [place] - [address] AT SEC OF [facility] &  - [phone] PH - [phone] FX  [address], [place] [address]      Phone: [phone]   dicyclomine 20 MG tablet         FOLLOW-UP  Jessica Inman, APRN  [address]  [place] [address] [phone]    Schedule an appointment as soon as possible for a visit in 4 days  As needed, If symptoms worsen        Latest Documented Vital Signs:  As of 15:23 EDT  BP- 151/84 HR- 63 Temp- 97.4 °F (36.3 °C) O2 sat- 99%    Appropriate PPE utilized throughout this patient encounter to include mask, if indicated, per current protocol. Hand hygiene was performed before donning PPE and after removal when leaving the room.    Please note that portions of this were completed with a voice recognition program.     Note Disclaimer: At [facility], we believe that sharing information builds trust and better relationships. You are receiving this note because you are receiving care at [facility] or recently visited. It is possible you will see health information before a provider has talked with you about it. This kind of information can be easy to misunderstand. To help you fully understand what it means for your health, we urge you to discuss this note with your provider.

## 2024-03-22 NOTE — TELEPHONE ENCOUNTER
Pt wife who is on verbal called in with concern of abdominal pain, when pt eats causes stomach boating and pain, nausea and night sweats. Pt wife states abdominal pain started 3 days ago. Pt wife was wanting same day appt, informed that provider is out of office and other provider in office schedule is full. Advised pt wife to take pt to ED to be seen.

## 2024-03-23 LAB
QT INTERVAL: 404 MS
QTC INTERVAL: 409 MS

## 2024-04-09 DIAGNOSIS — E11.65 TYPE 2 DIABETES MELLITUS WITH HYPERGLYCEMIA, WITHOUT LONG-TERM CURRENT USE OF INSULIN: ICD-10-CM

## 2024-04-09 DIAGNOSIS — E03.9 ACQUIRED HYPOTHYROIDISM: ICD-10-CM

## 2024-04-09 RX ORDER — EMPAGLIFLOZIN 25 MG/1
TABLET, FILM COATED ORAL
Qty: 90 TABLET | Refills: 1 | Status: SHIPPED | OUTPATIENT
Start: 2024-04-09

## 2024-04-09 RX ORDER — LEVOTHYROXINE SODIUM 0.15 MG/1
150 TABLET ORAL DAILY
Qty: 90 TABLET | Refills: 1 | Status: SHIPPED | OUTPATIENT
Start: 2024-04-09

## 2024-05-16 DIAGNOSIS — E11.65 TYPE 2 DIABETES MELLITUS WITH HYPERGLYCEMIA, WITHOUT LONG-TERM CURRENT USE OF INSULIN: Primary | ICD-10-CM

## 2024-05-16 DIAGNOSIS — E03.9 ACQUIRED HYPOTHYROIDISM: ICD-10-CM

## 2024-05-16 DIAGNOSIS — E78.2 MULTIPLE-TYPE HYPERLIPIDEMIA: ICD-10-CM

## 2024-05-22 ENCOUNTER — LAB (OUTPATIENT)
Dept: FAMILY MEDICINE CLINIC | Facility: CLINIC | Age: 49
End: 2024-05-22
Payer: COMMERCIAL

## 2024-05-22 PROCEDURE — 80053 COMPREHEN METABOLIC PANEL: CPT | Performed by: NURSE PRACTITIONER

## 2024-05-22 PROCEDURE — 36415 COLL VENOUS BLD VENIPUNCTURE: CPT | Performed by: NURSE PRACTITIONER

## 2024-05-22 PROCEDURE — 84443 ASSAY THYROID STIM HORMONE: CPT | Performed by: NURSE PRACTITIONER

## 2024-05-22 PROCEDURE — 83036 HEMOGLOBIN GLYCOSYLATED A1C: CPT | Performed by: NURSE PRACTITIONER

## 2024-05-22 PROCEDURE — 80061 LIPID PANEL: CPT | Performed by: NURSE PRACTITIONER

## 2024-05-22 PROCEDURE — 85027 COMPLETE CBC AUTOMATED: CPT | Performed by: NURSE PRACTITIONER

## 2024-05-23 LAB
ALBUMIN SERPL-MCNC: 4.4 G/DL (ref 3.5–5.2)
ALBUMIN/GLOB SERPL: 1.8 G/DL
ALP SERPL-CCNC: 34 U/L (ref 39–117)
ALT SERPL W P-5'-P-CCNC: 16 U/L (ref 1–41)
ANION GAP SERPL CALCULATED.3IONS-SCNC: 9 MMOL/L (ref 5–15)
AST SERPL-CCNC: 16 U/L (ref 1–40)
BILIRUB SERPL-MCNC: 0.7 MG/DL (ref 0–1.2)
BUN SERPL-MCNC: 8 MG/DL (ref 6–20)
BUN/CREAT SERPL: 11.3 (ref 7–25)
CALCIUM SPEC-SCNC: 8.5 MG/DL (ref 8.6–10.5)
CHLORIDE SERPL-SCNC: 100 MMOL/L (ref 98–107)
CHOLEST SERPL-MCNC: 187 MG/DL (ref 0–200)
CO2 SERPL-SCNC: 27 MMOL/L (ref 22–29)
CREAT SERPL-MCNC: 0.71 MG/DL (ref 0.76–1.27)
DEPRECATED RDW RBC AUTO: 38.7 FL (ref 37–54)
EGFRCR SERPLBLD CKD-EPI 2021: 113.9 ML/MIN/1.73
ERYTHROCYTE [DISTWIDTH] IN BLOOD BY AUTOMATED COUNT: 12.1 % (ref 12.3–15.4)
GLOBULIN UR ELPH-MCNC: 2.5 GM/DL
GLUCOSE SERPL-MCNC: 107 MG/DL (ref 65–99)
HBA1C MFR BLD: 6.1 % (ref 4.8–5.6)
HCT VFR BLD AUTO: 44 % (ref 37.5–51)
HDLC SERPL-MCNC: 59 MG/DL (ref 40–60)
HGB BLD-MCNC: 14.3 G/DL (ref 13–17.7)
LDLC SERPL CALC-MCNC: 110 MG/DL (ref 0–100)
LDLC/HDLC SERPL: 1.83 {RATIO}
MCH RBC QN AUTO: 28.6 PG (ref 26.6–33)
MCHC RBC AUTO-ENTMCNC: 32.5 G/DL (ref 31.5–35.7)
MCV RBC AUTO: 88 FL (ref 79–97)
PLATELET # BLD AUTO: 250 10*3/MM3 (ref 140–450)
PMV BLD AUTO: 11.2 FL (ref 6–12)
POTASSIUM SERPL-SCNC: 4.3 MMOL/L (ref 3.5–5.2)
PROT SERPL-MCNC: 6.9 G/DL (ref 6–8.5)
RBC # BLD AUTO: 5 10*6/MM3 (ref 4.14–5.8)
SODIUM SERPL-SCNC: 136 MMOL/L (ref 136–145)
TRIGL SERPL-MCNC: 99 MG/DL (ref 0–150)
TSH SERPL DL<=0.05 MIU/L-ACNC: 0.11 UIU/ML (ref 0.27–4.2)
VLDLC SERPL-MCNC: 18 MG/DL (ref 5–40)
WBC NRBC COR # BLD AUTO: 5.85 10*3/MM3 (ref 3.4–10.8)

## 2024-05-29 ENCOUNTER — OFFICE VISIT (OUTPATIENT)
Dept: FAMILY MEDICINE CLINIC | Facility: CLINIC | Age: 49
End: 2024-05-29
Payer: COMMERCIAL

## 2024-05-29 VITALS
HEART RATE: 68 BPM | SYSTOLIC BLOOD PRESSURE: 127 MMHG | WEIGHT: 172.2 LBS | HEIGHT: 67 IN | BODY MASS INDEX: 27.03 KG/M2 | DIASTOLIC BLOOD PRESSURE: 78 MMHG | OXYGEN SATURATION: 98 % | TEMPERATURE: 98.1 F

## 2024-05-29 DIAGNOSIS — Z12.11 COLON CANCER SCREENING: ICD-10-CM

## 2024-05-29 DIAGNOSIS — E03.9 ACQUIRED HYPOTHYROIDISM: ICD-10-CM

## 2024-05-29 DIAGNOSIS — Z00.00 PREVENTATIVE HEALTH CARE: Primary | ICD-10-CM

## 2024-05-29 DIAGNOSIS — F41.9 ANXIETY: ICD-10-CM

## 2024-05-29 DIAGNOSIS — L81.9 HYPERPIGMENTATION OF SKIN: ICD-10-CM

## 2024-05-29 DIAGNOSIS — E11.65 TYPE 2 DIABETES MELLITUS WITH HYPERGLYCEMIA, WITHOUT LONG-TERM CURRENT USE OF INSULIN: ICD-10-CM

## 2024-05-29 DIAGNOSIS — F32.9 REACTIVE DEPRESSION: ICD-10-CM

## 2024-05-29 DIAGNOSIS — G47.33 OSA (OBSTRUCTIVE SLEEP APNEA): ICD-10-CM

## 2024-05-29 PROCEDURE — 99396 PREV VISIT EST AGE 40-64: CPT | Performed by: NURSE PRACTITIONER

## 2024-05-29 RX ORDER — LEVOTHYROXINE SODIUM 137 UG/1
137 TABLET ORAL DAILY
Qty: 30 TABLET | Refills: 1 | Status: SHIPPED | OUTPATIENT
Start: 2024-05-29

## 2024-05-29 RX ORDER — ESCITALOPRAM OXALATE 5 MG/1
5 TABLET ORAL DAILY
Qty: 90 TABLET | Refills: 1 | Status: SHIPPED | OUTPATIENT
Start: 2024-05-29

## 2024-05-29 RX ORDER — CLOTRIMAZOLE AND BETAMETHASONE DIPROPIONATE 10; .64 MG/G; MG/G
1 CREAM TOPICAL 2 TIMES DAILY
Qty: 45 G | Refills: 1 | Status: SHIPPED | OUTPATIENT
Start: 2024-05-29

## 2024-05-29 NOTE — PROGRESS NOTES
Chief Complaint  Chief Complaint   Patient presents with    Annual Exam           Subjective          Naveen Zapata presents to [facility] for   History of Present Illness    48-year-old male patient presents for annual exam    Diabetes mellitus type II, feels stable on meds, denies any signs/symptoms of hyper/hypoglycemia, blurry vision, polydipsia, polyuria, nocturia, and unintentional weight loss    Hypothyroidism, taking levothyroxine 150 mcg daily, does report increased anxiety, weight loss and occasional night sweats, denies constipation, hair loss, abnormal heart rate and fatigue.     Patient was seen in the ED on 3/22/2024 for right upper quadrant pain, nausea, night sweats after a day of not eating much and nearly passed out. CT abdomen pelvis showed a 9 mm cyst in the right hepatic lobe, normal appendix/gallbladder, no obstruction, with no acute process identified.  He was prescribed Bentyl, reports symptoms resolved    Anxiety, stressed with pressure from family/work, easily annoyed, lack of focus, decreased sexual desires, lack of appetite. Patient denies insomnia, hypersomnia, psychomotor agitation, psychomotor retardation, fatigue (loss of energy), feelings of worthlessness (guilt), thoughts of death or suicide.       Mild obstructive sleep apnea, patient did hear from [facility] but did not call back to initiate CPAP.  Reports he snores, is sleeping well, has no difficulty falling asleep but wakes occasionally through the night. He denies fatigue, gasping or witnessed apnea.    Here to review labs      PHQ-9 Depression Screening  Little interest or pleasure in doing things? 1-->several days   Feeling down, depressed, or hopeless? 2-->more than half the days   Trouble falling or staying asleep, or sleeping too much? 2-->more than half the days   Feeling tired or having little energy? 2-->more than half the days   Poor appetite or overeating? 1-->several days   Feeling bad  about yourself - or that you are a failure or have let yourself or your family down? 2-->more than half the days   Trouble concentrating on things, such as reading the newspaper or watching television? 2-->more than half the days   Moving or speaking so slowly that other people could have noticed? Or the opposite - being so fidgety or restless that you have been moving around a lot more than usual? 0-->not at all   Thoughts that you would be better off dead, or of hurting yourself in some way? 0-->not at all   PHQ-9 Total Score 12   If you checked off any problems, how difficult have these problems made it for you to do your work, take care of things at home, or get along with other people? somewhat difficult         Over the last two weeks, how often have you been bothered by the following problems?  Feeling nervous, anxious or on edge: More than half the days  Not being able to stop or control worrying: More than half the days  Worrying too much about different things: More than half the days  Trouble Relaxing: More than half the days  Being so restless that it is hard to sit still: Several days  Becoming easily annoyed or irritable: Nearly every day  Feeling afraid as if something awful might happen: Several days  CECI 7 Total Score: 13  If you checked any problems, how difficult have these problems made it for you to do your work, take care of things at home, or get along with other people: Somewhat difficult        The following portions of the patient's history were reviewed and updated as appropriate: allergies, current medications, past family history, past medical history, past social history, past surgical history and problem list.    Past Medical History:   Diagnosis Date    History of echocardiogram 10/23/2019    History of stress test 10/24/2019    Hypothyroidism     Syncope and collapse     Type 2 diabetes mellitus      Past Surgical History:   Procedure Laterality Date    SHOULDER ARTHROSCOPY Right  10/02/2020     Family History   Problem Relation Age of Onset    Diabetes Mother     Heart disease Mother     Diabetes Father     Heart attack Father     Stroke Father     Diabetes Maternal Grandmother     Diabetes Maternal Grandfather     Diabetes Paternal Grandmother     Diabetes Paternal Grandfather      Social History     Tobacco Use    Smoking status: Every Day     Average packs/day: 0.6 packs/day for 20.0 years (12.0 ttl pk-yrs)     Types: Cigarettes     Start date:      Last attempt to quit:      Years since quittin.4     Passive exposure: Current    Smokeless tobacco: Never    Tobacco comments:     Patient states that he has started back smoking 2024   Substance Use Topics    Alcohol use: No       Current Outpatient Medications:     dicyclomine (BENTYL) 20 MG tablet, Take 1 tablet by mouth Every 6 (Six) Hours As Needed for Abdominal Cramping., Disp: 30 tablet, Rfl: 0    glucose blood (FREESTYLE TEST STRIPS) test strip, Use to check blood glucose twice daily as needed DX: E11.65, Disp: 100 each, Rfl: 11    glucose monitoring kit (FREESTYLE) monitoring kit, Please provide glucometer on patient's formulary to check blood glucose twice daily. DX: E11.65, Disp: 1 each, Rfl: 0    Jardiance 25 MG tablet tablet, TAKE 1 TABLET BY MOUTH DAILY, Disp: 90 tablet, Rfl: 1    Lancets (FREESTYLE) lancets, Use to check blood glucose twice daily as needed DX: E11.65, Disp: 100 each, Rfl: 11    levothyroxine (SYNTHROID, LEVOTHROID) 137 MCG tablet, Take 1 tablet by mouth Daily., Disp: 30 tablet, Rfl: 1    metFORMIN (GLUCOPHAGE) 1000 MG tablet, Take 1 tablet by mouth 2 (Two) Times a Day With Meals., Disp: 180 tablet, Rfl: 1    clotrimazole-betamethasone (LOTRISONE) 1-0.05 % cream, Apply 1 Application topically to the appropriate area as directed 2 (Two) Times a Day., Disp: 45 g, Rfl: 1    escitalopram (Lexapro) 5 MG tablet, Take 1 tablet by mouth Daily., Disp: 90 tablet, Rfl: 1    Objective   Vital Signs:   BP  "127/78 (BP Location: Right arm, Patient Position: Sitting, Cuff Size: Adult)   Pulse 68   Temp 98.1 °F (36.7 °C) (Temporal)   Ht 170.2 cm (67\")   Wt 78.1 kg (172 lb 3.2 oz)   SpO2 98%   BMI 26.97 kg/m²           Physical Exam  Constitutional:       General: He is not in acute distress.     Appearance: Normal appearance. He is well-developed. He is not ill-appearing or diaphoretic.   HENT:      Head: Normocephalic.   Eyes:      Conjunctiva/sclera: Conjunctivae normal.      Pupils: Pupils are equal, round, and reactive to light.   Neck:      Thyroid: No thyromegaly.      Vascular: No JVD.   Cardiovascular:      Rate and Rhythm: Normal rate and regular rhythm.      Heart sounds: Normal heart sounds. No murmur heard.  Pulmonary:      Effort: Pulmonary effort is normal. No respiratory distress.      Breath sounds: Normal breath sounds. No wheezing or rhonchi.   Abdominal:      General: Bowel sounds are normal. There is no distension.      Palpations: Abdomen is soft.      Tenderness: There is no abdominal tenderness.   Musculoskeletal:         General: No swelling or tenderness. Normal range of motion.      Cervical back: Normal range of motion and neck supple. No tenderness.   Lymphadenopathy:      Cervical: No cervical adenopathy.   Skin:     General: Skin is warm and dry.      Coloration: Skin is not jaundiced.      Findings: Rash (hyperpigmentation of B scapula regions) present. No erythema.          Neurological:      General: No focal deficit present.      Mental Status: He is alert and oriented to person, place, and time. Mental status is at baseline.      Sensory: No sensory deficit.      Motor: No weakness.      Gait: Gait normal.   Psychiatric:         Mood and Affect: Mood normal.         Behavior: Behavior normal.         Thought Content: Thought content normal.         Judgment: Judgment normal.          Result Review :     No visits with results within 7 Day(s) from this visit.   Latest known visit " with results is:   Orders Only on 05/16/2024   Component Date Value Ref Range Status    Glucose 05/22/2024 107 (H)  65 - 99 mg/dL Final    BUN 05/22/2024 8  6 - 20 mg/dL Final    Creatinine 05/22/2024 0.71 (L)  0.76 - 1.27 mg/dL Final    Sodium 05/22/2024 136  136 - 145 mmol/L Final    Potassium 05/22/2024 4.3  3.5 - 5.2 mmol/L Final    Chloride 05/22/2024 100  98 - 107 mmol/L Final    CO2 05/22/2024 27.0  22.0 - 29.0 mmol/L Final    Calcium 05/22/2024 8.5 (L)  8.6 - 10.5 mg/dL Final    Total Protein 05/22/2024 6.9  6.0 - 8.5 g/dL Final    Albumin 05/22/2024 4.4  3.5 - 5.2 g/dL Final    ALT (SGPT) 05/22/2024 16  1 - 41 U/L Final    AST (SGOT) 05/22/2024 16  1 - 40 U/L Final    Alkaline Phosphatase 05/22/2024 34 (L)  39 - 117 U/L Final    Total Bilirubin 05/22/2024 0.7  0.0 - 1.2 mg/dL Final    Globulin 05/22/2024 2.5  gm/dL Final    A/G Ratio 05/22/2024 1.8  g/dL Final    BUN/Creatinine Ratio 05/22/2024 11.3  7.0 - 25.0 Final    Anion Gap 05/22/2024 9.0  5.0 - 15.0 mmol/L Final    eGFR 05/22/2024 113.9  >60.0 mL/min/1.73 Final    WBC 05/22/2024 5.85  3.40 - 10.80 10*3/mm3 Final    RBC 05/22/2024 5.00  4.14 - 5.80 10*6/mm3 Final    Hemoglobin 05/22/2024 14.3  13.0 - 17.7 g/dL Final    Hematocrit 05/22/2024 44.0  37.5 - 51.0 % Final    MCV 05/22/2024 88.0  79.0 - 97.0 fL Final    MCH 05/22/2024 28.6  26.6 - 33.0 pg Final    MCHC 05/22/2024 32.5  31.5 - 35.7 g/dL Final    RDW 05/22/2024 12.1 (L)  12.3 - 15.4 % Final    RDW-SD 05/22/2024 38.7  37.0 - 54.0 fl Final    MPV 05/22/2024 11.2  6.0 - 12.0 fL Final    Platelets 05/22/2024 250  140 - 450 10*3/mm3 Final    Hemoglobin A1C 05/22/2024 6.10 (H)  4.80 - 5.60 % Final    Total Cholesterol 05/22/2024 187  0 - 200 mg/dL Final    Triglycerides 05/22/2024 99  0 - 150 mg/dL Final    HDL Cholesterol 05/22/2024 59  40 - 60 mg/dL Final    LDL Cholesterol  05/22/2024 110 (H)  0 - 100 mg/dL Final    VLDL Cholesterol 05/22/2024 18  5 - 40 mg/dL Final    LDL/HDL Ratio 05/22/2024  1.83   Final    TSH 05/22/2024 0.114 (L)  0.270 - 4.200 uIU/mL Final                  BMI is >= 25 and <30. (Overweight) The following options were offered after discussion;: exercise counseling/recommendations and nutrition counseling/recommendations           Assessment and Plan    Diagnoses and all orders for this visit:    1. Preventative health care (Primary)    2. Acquired hypothyroidism  -     levothyroxine (SYNTHROID, LEVOTHROID) 137 MCG tablet; Take 1 tablet by mouth Daily.  Dispense: 30 tablet; Refill: 1    3. Type 2 diabetes mellitus with hyperglycemia, without long-term current use of insulin  -     metFORMIN (GLUCOPHAGE) 1000 MG tablet; Take 1 tablet by mouth 2 (Two) Times a Day With Meals.  Dispense: 180 tablet; Refill: 1    4. Anxiety    5. Colon cancer screening  -     Cologuard - Stool, Per Rectum; Future    6. Reactive depression    7. Hyperpigmentation of skin    8. YOEL (obstructive sleep apnea)    Other orders  -     escitalopram (Lexapro) 5 MG tablet; Take 1 tablet by mouth Daily.  Dispense: 90 tablet; Refill: 1  -     clotrimazole-betamethasone (LOTRISONE) 1-0.05 % cream; Apply 1 Application topically to the appropriate area as directed 2 (Two) Times a Day.  Dispense: 45 g; Refill: 1      Conditions mostly stable  rf meds as above  Labs reviewed, mostly within normal limits  -Lipids improved  -hgba1c stable-continue Jardiance and metformin  -TSH over active, decrease levothyroxine to 137 mcg daily  Rec  trial of Lexapro.  Recommend stress relieving measures, walking, meditation, stretching, sunlight, take a vacation from work.   Mild YOEL, Okay to hold on CPAP for now, will address again in future if needed.  Recommend head and shoulder shampoo to rash of back and try Lotrisone  Age appropriate preventative counseling provided, including healthy lifestyle modifications and exercise      I spent 30 minutes caring for Naveen Zapata on this date of service. This time includes time spent by me in  the following activities: preparing for the visit, reviewing tests, performing a medically appropriate examination and/or evaluation , counseling and educating the patient/family/caregiver, ordering medications, tests, or procedures and documenting information in the medical record        Follow Up     Return for Recheck DM, thyroid. TSH in 6 wks. DM panel prior to appt .  Patient was given instructions and counseling regarding his condition or for health maintenance advice. Please see specific information pulled into the AVS if appropriate.        Part of this note may be an electronic transcription/translation of spoken language to printed text using the Dragon Dictation System

## 2024-07-02 DIAGNOSIS — E03.9 ACQUIRED HYPOTHYROIDISM: Primary | ICD-10-CM

## 2024-07-08 ENCOUNTER — LAB (OUTPATIENT)
Dept: FAMILY MEDICINE CLINIC | Facility: CLINIC | Age: 49
End: 2024-07-08
Payer: COMMERCIAL

## 2024-07-08 DIAGNOSIS — E03.9 ACQUIRED HYPOTHYROIDISM: ICD-10-CM

## 2024-07-08 PROCEDURE — 84443 ASSAY THYROID STIM HORMONE: CPT | Performed by: NURSE PRACTITIONER

## 2024-07-08 PROCEDURE — 36415 COLL VENOUS BLD VENIPUNCTURE: CPT

## 2024-07-09 LAB — TSH SERPL DL<=0.05 MIU/L-ACNC: 0.15 UIU/ML (ref 0.27–4.2)

## 2024-07-15 DIAGNOSIS — E03.9 ACQUIRED HYPOTHYROIDISM: ICD-10-CM

## 2024-07-15 RX ORDER — LEVOTHYROXINE SODIUM 0.12 MG/1
125 TABLET ORAL DAILY
Qty: 30 TABLET | Refills: 1 | Status: SHIPPED | OUTPATIENT
Start: 2024-07-15

## 2024-07-17 ENCOUNTER — OFFICE VISIT (OUTPATIENT)
Dept: FAMILY MEDICINE CLINIC | Facility: CLINIC | Age: 49
End: 2024-07-17
Payer: COMMERCIAL

## 2024-07-17 VITALS
RESPIRATION RATE: 18 BRPM | HEART RATE: 66 BPM | WEIGHT: 176.6 LBS | OXYGEN SATURATION: 97 % | BODY MASS INDEX: 27.72 KG/M2 | DIASTOLIC BLOOD PRESSURE: 75 MMHG | SYSTOLIC BLOOD PRESSURE: 112 MMHG | TEMPERATURE: 97.7 F | HEIGHT: 67 IN

## 2024-07-17 DIAGNOSIS — F41.9 ANXIETY: ICD-10-CM

## 2024-07-17 DIAGNOSIS — L81.9 HYPERPIGMENTATION OF SKIN: ICD-10-CM

## 2024-07-17 DIAGNOSIS — E03.9 ACQUIRED HYPOTHYROIDISM: Primary | ICD-10-CM

## 2024-07-17 PROCEDURE — 99214 OFFICE O/P EST MOD 30 MIN: CPT | Performed by: NURSE PRACTITIONER

## 2024-07-17 NOTE — PROGRESS NOTES
Chief Complaint  Chief Complaint   Patient presents with    Follow-up           Subjective          Naveen Zapata presents to [facility] for   History of Present Illness      Patient presents for 6-week follow-up on anxiety, he took some time off work. Much improved on lexapro now    Hypothyroidism, levothyroxine was decreased already and he started new dose for TSH of 0.146 on 7/8/2024, he feels stable on medication, denies symptoms of constipation, weight gain/loss, hot or cold intolerance, hair loss, abnormal heart rate and fatigue.      PHQ-9 Depression Screening  Little interest or pleasure in doing things? 0-->not at all   Feeling down, depressed, or hopeless? 0-->not at all   Trouble falling or staying asleep, or sleeping too much?     Feeling tired or having little energy?     Poor appetite or overeating?     Feeling bad about yourself - or that you are a failure or have let yourself or your family down?     Trouble concentrating on things, such as reading the newspaper or watching television?     Moving or speaking so slowly that other people could have noticed? Or the opposite - being so fidgety or restless that you have been moving around a lot more than usual?     Thoughts that you would be better off dead, or of hurting yourself in some way?     PHQ-9 Total Score 0   If you checked off any problems, how difficult have these problems made it for you to do your work, take care of things at home, or get along with other people?       Over the last two weeks, how often have you been bothered by the following problems?  Feeling nervous, anxious or on edge: Not at all  Not being able to stop or control worrying: Not at all  Worrying too much about different things: Not at all  Trouble Relaxing: Not at all  Being so restless that it is hard to sit still: Not at all  Becoming easily annoyed or irritable: Not at all  Feeling afraid as if something awful might happen: Not at  all  CECI 7 Total Score: 0  If you checked any problems, how difficult have these problems made it for you to do your work, take care of things at home, or get along with other people: Not difficult at all      The following portions of the patient's history were reviewed and updated as appropriate: allergies, current medications, past family history, past medical history, past social history, past surgical history and problem list.    Past Medical History:   Diagnosis Date    History of echocardiogram 10/23/2019    History of stress test 10/24/2019    Hypothyroidism     Syncope and collapse     Type 2 diabetes mellitus      Past Surgical History:   Procedure Laterality Date    SHOULDER ARTHROSCOPY Right 10/02/2020     Family History   Problem Relation Age of Onset    Diabetes Mother     Heart disease Mother     Diabetes Father     Heart attack Father     Stroke Father     Diabetes Maternal Grandmother     Diabetes Maternal Grandfather     Diabetes Paternal Grandmother     Diabetes Paternal Grandfather      Social History     Tobacco Use    Smoking status: Former     Average packs/day: 0.6 packs/day for 20.0 years (12.0 ttl pk-yrs)     Types: Cigarettes     Start date:      Quit date:      Years since quittin.5     Passive exposure: Past    Smokeless tobacco: Never    Tobacco comments:     Patient states that he has started back smoking 2024   Substance Use Topics    Alcohol use: No       Current Outpatient Medications:     clotrimazole-betamethasone (LOTRISONE) 1-0.05 % cream, Apply 1 Application topically to the appropriate area as directed 2 (Two) Times a Day., Disp: 45 g, Rfl: 1    escitalopram (Lexapro) 5 MG tablet, Take 1 tablet by mouth Daily., Disp: 90 tablet, Rfl: 1    glucose blood (FREESTYLE TEST STRIPS) test strip, Use to check blood glucose twice daily as needed DX: E11.65, Disp: 100 each, Rfl: 11    glucose monitoring kit (FREESTYLE) monitoring kit, Please provide glucometer on  "patient's formulary to check blood glucose twice daily. DX: E11.65, Disp: 1 each, Rfl: 0    Jardiance 25 MG tablet tablet, TAKE 1 TABLET BY MOUTH DAILY, Disp: 90 tablet, Rfl: 1    Lancets (FREESTYLE) lancets, Use to check blood glucose twice daily as needed DX: E11.65, Disp: 100 each, Rfl: 11    levothyroxine (SYNTHROID, LEVOTHROID) 125 MCG tablet, Take 1 tablet by mouth Daily., Disp: 30 tablet, Rfl: 1    metFORMIN (GLUCOPHAGE) 1000 MG tablet, Take 1 tablet by mouth 2 (Two) Times a Day With Meals., Disp: 180 tablet, Rfl: 1    dicyclomine (BENTYL) 20 MG tablet, Take 1 tablet by mouth Every 6 (Six) Hours As Needed for Abdominal Cramping. (Patient not taking: Reported on 7/17/2024), Disp: 30 tablet, Rfl: 0    Objective   Vital Signs:   /75 (BP Location: Left arm, Patient Position: Sitting, Cuff Size: Adult)   Pulse 66   Temp 97.7 °F (36.5 °C)   Resp 18   Ht 170.2 cm (67\")   Wt 80.1 kg (176 lb 9.6 oz)   SpO2 97%   BMI 27.66 kg/m²           Physical Exam  Constitutional:       General: He is not in acute distress.     Appearance: Normal appearance. He is well-developed. He is not ill-appearing or diaphoretic.   HENT:      Head: Normocephalic.   Eyes:      Conjunctiva/sclera: Conjunctivae normal.      Pupils: Pupils are equal, round, and reactive to light.   Neck:      Thyroid: No thyromegaly.      Vascular: No JVD.   Cardiovascular:      Rate and Rhythm: Normal rate and regular rhythm.      Heart sounds: Normal heart sounds. No murmur heard.  Pulmonary:      Effort: Pulmonary effort is normal. No respiratory distress.      Breath sounds: Normal breath sounds. No wheezing or rhonchi.   Abdominal:      General: Bowel sounds are normal. There is no distension.      Palpations: Abdomen is soft.      Tenderness: There is no abdominal tenderness.   Musculoskeletal:         General: No swelling or tenderness. Normal range of motion.      Cervical back: Normal range of motion and neck supple. No tenderness. "   Lymphadenopathy:      Cervical: No cervical adenopathy.   Skin:     General: Skin is warm and dry.      Coloration: Skin is not jaundiced.      Findings: No erythema or rash.   Neurological:      General: No focal deficit present.      Mental Status: He is alert and oriented to person, place, and time. Mental status is at baseline.      Sensory: No sensory deficit.      Motor: No weakness.      Gait: Gait normal.   Psychiatric:         Mood and Affect: Mood normal.         Behavior: Behavior normal.         Thought Content: Thought content normal.         Judgment: Judgment normal.          Result Review :     No visits with results within 7 Day(s) from this visit.   Latest known visit with results is:   Lab on 07/08/2024   Component Date Value Ref Range Status    TSH 07/08/2024 0.146 (L)  0.270 - 4.200 uIU/mL Final                              Assessment and Plan    Diagnoses and all orders for this visit:    1. Acquired hypothyroidism (Primary)    2. Anxiety    3. Hyperpigmentation of skin    Conditions stable  Continue Lexapro  Continue current dose of levothyroxine 125 mcg daily  Continue otherwise current med regimen    I spent 30 minutes caring for Naveen Zapata on this date of service. This time includes time spent by me in the following activities: preparing for the visit, reviewing tests, performing a medically appropriate examination and/or evaluation , counseling and educating the patient/family/caregiver, ordering medications, tests, or procedures and documenting information in the medical record        Follow Up     Return in about 4 months (around 11/17/2024) for Recheck DM, anxiety. DM panel prior to appt. also sched TSH in 6 wks. .  Patient was given instructions and counseling regarding his condition or for health maintenance advice. Please see specific information pulled into the AVS if appropriate.        Part of this note may be an electronic transcription/translation of spoken language to  printed text using the Dragon Dictation System

## 2024-08-02 DIAGNOSIS — E03.9 ACQUIRED HYPOTHYROIDISM: ICD-10-CM

## 2024-08-02 RX ORDER — LEVOTHYROXINE SODIUM 137 UG/1
137 TABLET ORAL DAILY
Qty: 30 TABLET | Refills: 1 | OUTPATIENT
Start: 2024-08-02

## 2024-08-02 RX ORDER — ESCITALOPRAM OXALATE 10 MG/1
10 TABLET ORAL DAILY
Qty: 90 TABLET | Refills: 0 | Status: SHIPPED | OUTPATIENT
Start: 2024-08-02

## 2024-08-02 NOTE — TELEPHONE ENCOUNTER
Spoke with Naveen Zapata. Stated that medication is not as effective as it was when she first started the prescription. States he previously discussed with Jessica that dose could be increased if needed

## 2024-08-02 NOTE — TELEPHONE ENCOUNTER
Caller: Naveen Zapata    Relationship: Self    Best call back number:     [phone] (Mobile)       What medication are you requesting: INCREASE THE DOSE OF ESCITALOPRAM TO 10MG      Have you had these symptoms before:    [x] Yes  [] No    Have you been treated for these symptoms before:   [x] Yes  [] No    If a prescription is needed, what is your preferred pharmacy and phone number: [id number] - [place], [place] - [address] - [phone]  - [phone] FX     Additional notes:

## 2024-08-22 DIAGNOSIS — E03.9 ACQUIRED HYPOTHYROIDISM: Primary | ICD-10-CM

## 2024-08-29 ENCOUNTER — LAB (OUTPATIENT)
Dept: FAMILY MEDICINE CLINIC | Facility: CLINIC | Age: 49
End: 2024-08-29
Payer: COMMERCIAL

## 2024-08-29 PROCEDURE — 84443 ASSAY THYROID STIM HORMONE: CPT | Performed by: NURSE PRACTITIONER

## 2024-08-29 PROCEDURE — 36415 COLL VENOUS BLD VENIPUNCTURE: CPT | Performed by: NURSE PRACTITIONER

## 2024-08-30 LAB — TSH SERPL DL<=0.05 MIU/L-ACNC: 0.26 UIU/ML (ref 0.27–4.2)

## 2024-09-06 DIAGNOSIS — E03.9 ACQUIRED HYPOTHYROIDISM: ICD-10-CM

## 2024-09-06 RX ORDER — LEVOTHYROXINE SODIUM 125 UG/1
125 TABLET ORAL DAILY
Qty: 90 TABLET | Refills: 1 | Status: SHIPPED | OUTPATIENT
Start: 2024-09-06

## 2024-10-31 DIAGNOSIS — E03.9 ACQUIRED HYPOTHYROIDISM: ICD-10-CM

## 2024-10-31 RX ORDER — LEVOTHYROXINE SODIUM 150 UG/1
150 TABLET ORAL DAILY
Qty: 90 TABLET | Refills: 1 | OUTPATIENT
Start: 2024-10-31

## 2024-11-03 DIAGNOSIS — E11.65 TYPE 2 DIABETES MELLITUS WITH HYPERGLYCEMIA, WITHOUT LONG-TERM CURRENT USE OF INSULIN: ICD-10-CM

## 2024-11-04 RX ORDER — EMPAGLIFLOZIN 25 MG/1
TABLET, FILM COATED ORAL
Qty: 90 TABLET | Refills: 1 | Status: SHIPPED | OUTPATIENT
Start: 2024-11-04

## 2024-11-14 DIAGNOSIS — E11.65 TYPE 2 DIABETES MELLITUS WITH HYPERGLYCEMIA, WITHOUT LONG-TERM CURRENT USE OF INSULIN: Primary | ICD-10-CM

## 2024-11-14 DIAGNOSIS — E03.9 ACQUIRED HYPOTHYROIDISM: ICD-10-CM

## 2024-11-14 DIAGNOSIS — E78.2 MULTIPLE-TYPE HYPERLIPIDEMIA: ICD-10-CM

## 2024-11-15 DIAGNOSIS — Z12.5 PROSTATE CANCER SCREENING: Primary | ICD-10-CM

## 2024-11-18 ENCOUNTER — LAB (OUTPATIENT)
Dept: FAMILY MEDICINE CLINIC | Facility: CLINIC | Age: 49
End: 2024-11-18
Payer: COMMERCIAL

## 2024-11-18 PROCEDURE — 80053 COMPREHEN METABOLIC PANEL: CPT | Performed by: NURSE PRACTITIONER

## 2024-11-18 PROCEDURE — 84443 ASSAY THYROID STIM HORMONE: CPT | Performed by: NURSE PRACTITIONER

## 2024-11-18 PROCEDURE — 80061 LIPID PANEL: CPT | Performed by: NURSE PRACTITIONER

## 2024-11-18 PROCEDURE — 83036 HEMOGLOBIN GLYCOSYLATED A1C: CPT | Performed by: NURSE PRACTITIONER

## 2024-11-18 PROCEDURE — G0103 PSA SCREENING: HCPCS | Performed by: NURSE PRACTITIONER

## 2024-11-18 PROCEDURE — 82043 UR ALBUMIN QUANTITATIVE: CPT | Performed by: NURSE PRACTITIONER

## 2024-11-18 PROCEDURE — 85027 COMPLETE CBC AUTOMATED: CPT | Performed by: NURSE PRACTITIONER

## 2024-11-18 PROCEDURE — 36415 COLL VENOUS BLD VENIPUNCTURE: CPT | Performed by: NURSE PRACTITIONER

## 2024-11-19 LAB
ALBUMIN SERPL-MCNC: 4.4 G/DL (ref 3.5–5.2)
ALBUMIN UR-MCNC: <1.2 MG/DL
ALBUMIN/GLOB SERPL: 1.7 G/DL
ALP SERPL-CCNC: 40 U/L (ref 39–117)
ALT SERPL W P-5'-P-CCNC: 23 U/L (ref 1–41)
ANION GAP SERPL CALCULATED.3IONS-SCNC: 12.8 MMOL/L (ref 5–15)
AST SERPL-CCNC: 37 U/L (ref 1–40)
BILIRUB SERPL-MCNC: 1.2 MG/DL (ref 0–1.2)
BUN SERPL-MCNC: 14 MG/DL (ref 6–20)
BUN/CREAT SERPL: 16.5 (ref 7–25)
CALCIUM SPEC-SCNC: 8.8 MG/DL (ref 8.6–10.5)
CHLORIDE SERPL-SCNC: 97 MMOL/L (ref 98–107)
CHOLEST SERPL-MCNC: 226 MG/DL (ref 0–200)
CO2 SERPL-SCNC: 25.2 MMOL/L (ref 22–29)
CREAT SERPL-MCNC: 0.85 MG/DL (ref 0.76–1.27)
DEPRECATED RDW RBC AUTO: 40.3 FL (ref 37–54)
EGFRCR SERPLBLD CKD-EPI 2021: 107.2 ML/MIN/1.73
ERYTHROCYTE [DISTWIDTH] IN BLOOD BY AUTOMATED COUNT: 12.5 % (ref 12.3–15.4)
GLOBULIN UR ELPH-MCNC: 2.6 GM/DL
GLUCOSE SERPL-MCNC: 115 MG/DL (ref 65–99)
HBA1C MFR BLD: 6 % (ref 4.8–5.6)
HCT VFR BLD AUTO: 43.4 % (ref 37.5–51)
HDLC SERPL-MCNC: 70 MG/DL (ref 40–60)
HGB BLD-MCNC: 14.7 G/DL (ref 13–17.7)
LDLC SERPL CALC-MCNC: 131 MG/DL (ref 0–100)
LDLC/HDLC SERPL: 1.82 {RATIO}
MCH RBC QN AUTO: 30.2 PG (ref 26.6–33)
MCHC RBC AUTO-ENTMCNC: 33.9 G/DL (ref 31.5–35.7)
MCV RBC AUTO: 89.1 FL (ref 79–97)
PLATELET # BLD AUTO: 229 10*3/MM3 (ref 140–450)
PMV BLD AUTO: 10.8 FL (ref 6–12)
POTASSIUM SERPL-SCNC: 4.2 MMOL/L (ref 3.5–5.2)
PROT SERPL-MCNC: 7 G/DL (ref 6–8.5)
PSA SERPL-MCNC: 0.95 NG/ML (ref 0–4)
RBC # BLD AUTO: 4.87 10*6/MM3 (ref 4.14–5.8)
SODIUM SERPL-SCNC: 135 MMOL/L (ref 136–145)
TRIGL SERPL-MCNC: 143 MG/DL (ref 0–150)
TSH SERPL DL<=0.05 MIU/L-ACNC: 1.19 UIU/ML (ref 0.27–4.2)
VLDLC SERPL-MCNC: 25 MG/DL (ref 5–40)
WBC NRBC COR # BLD AUTO: 6.04 10*3/MM3 (ref 3.4–10.8)

## 2024-11-26 ENCOUNTER — OFFICE VISIT (OUTPATIENT)
Dept: FAMILY MEDICINE CLINIC | Facility: CLINIC | Age: 49
End: 2024-11-26
Payer: COMMERCIAL

## 2024-11-26 VITALS
HEART RATE: 62 BPM | HEIGHT: 67 IN | BODY MASS INDEX: 27.94 KG/M2 | WEIGHT: 178 LBS | DIASTOLIC BLOOD PRESSURE: 76 MMHG | SYSTOLIC BLOOD PRESSURE: 115 MMHG | OXYGEN SATURATION: 98 %

## 2024-11-26 DIAGNOSIS — E03.9 ACQUIRED HYPOTHYROIDISM: Primary | ICD-10-CM

## 2024-11-26 DIAGNOSIS — E78.2 MULTIPLE-TYPE HYPERLIPIDEMIA: ICD-10-CM

## 2024-11-26 DIAGNOSIS — F41.9 ANXIETY: ICD-10-CM

## 2024-11-26 DIAGNOSIS — Z23 INFLUENZA VACCINE ADMINISTERED: ICD-10-CM

## 2024-11-26 DIAGNOSIS — E11.65 TYPE 2 DIABETES MELLITUS WITH HYPERGLYCEMIA, WITHOUT LONG-TERM CURRENT USE OF INSULIN: ICD-10-CM

## 2024-11-26 PROCEDURE — 90656 IIV3 VACC NO PRSV 0.5 ML IM: CPT | Performed by: NURSE PRACTITIONER

## 2024-11-26 PROCEDURE — 99214 OFFICE O/P EST MOD 30 MIN: CPT | Performed by: NURSE PRACTITIONER

## 2024-11-26 PROCEDURE — 90471 IMMUNIZATION ADMIN: CPT | Performed by: NURSE PRACTITIONER

## 2024-11-26 RX ORDER — ESCITALOPRAM OXALATE 5 MG/1
1 TABLET ORAL DAILY
COMMUNITY
Start: 2024-11-03 | End: 2024-11-26 | Stop reason: SDUPTHER

## 2024-11-26 RX ORDER — ESCITALOPRAM OXALATE 5 MG/1
5 TABLET ORAL DAILY
Qty: 90 TABLET | Refills: 1 | Status: SHIPPED | OUTPATIENT
Start: 2024-11-26

## 2024-11-26 NOTE — PROGRESS NOTES
Chief Complaint  Chief Complaint   Patient presents with    Follow-up     Pt states he is doing well. Labs done 11/18/24    Diabetes    Anxiety           Subjective          Naveen Zapata presents to [facility] for   History of Present Illness    Diabetes mellitus type II, feels stable on meds, denies any signs/symptoms of hyper/hypoglycemia, blurry vision, polydipsia, polyuria, nocturia, and unintentional weight loss    Anxiety, much improved on lexapro 5mg, he is taking more time for himself, going to the gym now, feels much less stressed.  Patient denies significant weight loss/gain, insomnia, hypersomnia, psychomotor agitation, psychomotor retardation, fatigue (loss of energy), feelings of worthlessness (guilt), impaired concentration (indecisiveness), thoughts of death or suicide.       Hypothyroidism, stable on medication, denies symptoms of constipation, weight gain/loss, hot or cold intolerance, hair loss, abnormal heart rate and fatigue.     Here to review labs      The following portions of the patient's history were reviewed and updated as appropriate: allergies, current medications, past family history, past medical history, past social history, past surgical history and problem list.    Past Medical History:   Diagnosis Date    History of echocardiogram 10/23/2019    History of stress test 10/24/2019    Hypothyroidism     Syncope and collapse     Type 2 diabetes mellitus      Past Surgical History:   Procedure Laterality Date    SHOULDER ARTHROSCOPY Right 10/02/2020     Family History   Problem Relation Age of Onset    Diabetes Mother     Heart disease Mother     Diabetes Father     Heart attack Father     Stroke Father     Diabetes Maternal Grandmother     Diabetes Maternal Grandfather     Diabetes Paternal Grandmother     Diabetes Paternal Grandfather      Social History     Tobacco Use    Smoking status: Former     Average packs/day: 0.6 packs/day for 20.0 years (12.0  "ttl pk-yrs)     Types: Cigarettes     Start date:      Quit date: 2013     Years since quittin.9     Passive exposure: Past    Smokeless tobacco: Never    Tobacco comments:     Patient states that he has started back smoking 2024   Substance Use Topics    Alcohol use: No       Current Outpatient Medications:     escitalopram (LEXAPRO) 5 MG tablet, Take 1 tablet by mouth Daily., Disp: 90 tablet, Rfl: 1    metFORMIN (GLUCOPHAGE) 1000 MG tablet, Take 1 tablet by mouth 2 (Two) Times a Day With Meals., Disp: 180 tablet, Rfl: 1    clotrimazole-betamethasone (LOTRISONE) 1-0.05 % cream, Apply 1 Application topically to the appropriate area as directed 2 (Two) Times a Day., Disp: 45 g, Rfl: 1    glucose blood (FREESTYLE TEST STRIPS) test strip, Use to check blood glucose twice daily as needed DX: E11.65, Disp: 100 each, Rfl: 11    glucose monitoring kit (FREESTYLE) monitoring kit, Please provide glucometer on patient's formulary to check blood glucose twice daily. DX: E11.65, Disp: 1 each, Rfl: 0    Jardiance 25 MG tablet tablet, TAKE 1 TABLET BY MOUTH DAILY, Disp: 90 tablet, Rfl: 1    Lancets (FREESTYLE) lancets, Use to check blood glucose twice daily as needed DX: E11.65, Disp: 100 each, Rfl: 11    levothyroxine (SYNTHROID, LEVOTHROID) 125 MCG tablet, Take 1 tablet by mouth Daily., Disp: 90 tablet, Rfl: 1    Objective   Vital Signs:   Vitals:    24 0939   BP: 115/76   BP Location: Left arm   Patient Position: Sitting   Cuff Size: Adult   Pulse: 62   SpO2: 98%   Weight: 80.7 kg (178 lb)   Height: 170.2 cm (67\")   PainSc: 0-No pain       Body mass index is 27.88 kg/m².    Physical Exam  Constitutional:       General: He is not in acute distress.     Appearance: Normal appearance. He is well-developed. He is not ill-appearing or diaphoretic.   HENT:      Head: Normocephalic.   Eyes:      Conjunctiva/sclera: Conjunctivae normal.      Pupils: Pupils are equal, round, and reactive to light.   Neck:      " Thyroid: No thyromegaly.      Vascular: No JVD.   Cardiovascular:      Rate and Rhythm: Normal rate and regular rhythm.      Heart sounds: Normal heart sounds. No murmur heard.  Pulmonary:      Effort: Pulmonary effort is normal. No respiratory distress.      Breath sounds: Normal breath sounds. No wheezing or rhonchi.   Abdominal:      General: Bowel sounds are normal. There is no distension.      Palpations: Abdomen is soft.      Tenderness: There is no abdominal tenderness.   Musculoskeletal:         General: No swelling or tenderness. Normal range of motion.      Cervical back: Normal range of motion and neck supple. No tenderness.   Lymphadenopathy:      Cervical: No cervical adenopathy.   Skin:     General: Skin is warm and dry.      Coloration: Skin is not jaundiced.      Findings: No erythema or rash.   Neurological:      General: No focal deficit present.      Mental Status: He is alert and oriented to person, place, and time. Mental status is at baseline.      Sensory: No sensory deficit.      Motor: No weakness.      Gait: Gait normal.   Psychiatric:         Mood and Affect: Mood normal.         Behavior: Behavior normal.         Thought Content: Thought content normal.         Judgment: Judgment normal.          Result Review :     No visits with results within 7 Day(s) from this visit.   Latest known visit with results is:   Orders Only on 11/15/2024   Component Date Value Ref Range Status    PSA 11/18/2024 0.954  0.000 - 4.000 ng/mL Final                          Assessment and Plan    Diagnoses and all orders for this visit:    1. Acquired hypothyroidism (Primary)    2. Type 2 diabetes mellitus with hyperglycemia, without long-term current use of insulin  -     metFORMIN (GLUCOPHAGE) 1000 MG tablet; Take 1 tablet by mouth 2 (Two) Times a Day With Meals.  Dispense: 180 tablet; Refill: 1    3. Anxiety  -     escitalopram (LEXAPRO) 5 MG tablet; Take 1 tablet by mouth Daily.  Dispense: 90 tablet; Refill:  1    4. Multiple-type hyperlipidemia    5. Influenza vaccine administered  -     Fluzone >6mos      Conditions stable, rf meds as above, other rf when needed   Labs reviewed and essentially stable, hgba1c 6, lipids stable  DM Eye exam due in January  Flu shot today  All other vaccines up to date  Cologuard up to date      I spent 30 minutes caring for Naveen Zapata on this date of service. This time includes time spent by me in the following activities: preparing for the visit, reviewing tests, performing a medically appropriate examination and/or evaluation , counseling and educating the patient/family/caregiver, ordering medications, tests, or procedures and documenting information in the medical record        Follow Up     Return in about 6 months (around 5/26/2025) for Recheck, Annual physical. DM panel prior to appt .  Patient was given instructions and counseling regarding his condition or for health maintenance advice. Please see specific information pulled into the AVS if appropriate.        Part of this note may be an electronic transcription/translation of spoken language to printed text using the Dragon Dictation System

## 2025-01-09 RX ORDER — CLOTRIMAZOLE AND BETAMETHASONE DIPROPIONATE 10; .64 MG/G; MG/G
CREAM TOPICAL 2 TIMES DAILY
Qty: 45 G | Refills: 1 | Status: SHIPPED | OUTPATIENT
Start: 2025-01-09

## 2025-04-02 DIAGNOSIS — E03.9 ACQUIRED HYPOTHYROIDISM: ICD-10-CM

## 2025-04-02 RX ORDER — LEVOTHYROXINE SODIUM 125 UG/1
125 TABLET ORAL DAILY
Qty: 90 TABLET | Refills: 1 | Status: SHIPPED | OUTPATIENT
Start: 2025-04-02

## 2025-05-04 DIAGNOSIS — E11.65 TYPE 2 DIABETES MELLITUS WITH HYPERGLYCEMIA, WITHOUT LONG-TERM CURRENT USE OF INSULIN: ICD-10-CM

## 2025-05-04 DIAGNOSIS — E03.9 ACQUIRED HYPOTHYROIDISM: ICD-10-CM

## 2025-05-05 RX ORDER — LEVOTHYROXINE SODIUM 125 UG/1
125 TABLET ORAL DAILY
Qty: 30 TABLET | Refills: 1 | Status: SHIPPED | OUTPATIENT
Start: 2025-05-05

## 2025-05-05 RX ORDER — EMPAGLIFLOZIN 25 MG/1
25 TABLET, FILM COATED ORAL DAILY
Qty: 90 TABLET | Refills: 1 | Status: SHIPPED | OUTPATIENT
Start: 2025-05-05

## 2025-05-15 ENCOUNTER — TELEPHONE (OUTPATIENT)
Dept: FAMILY MEDICINE CLINIC | Facility: CLINIC | Age: 50
End: 2025-05-15
Payer: COMMERCIAL

## 2025-05-15 NOTE — TELEPHONE ENCOUNTER
Attempted to call pt needing to reschedule lab appt to 8:20-8:30 on same day, no answer: per verbal left msg for pt to call to reschedule

## 2025-05-21 DIAGNOSIS — E11.65 TYPE 2 DIABETES MELLITUS WITH HYPERGLYCEMIA, WITHOUT LONG-TERM CURRENT USE OF INSULIN: Primary | ICD-10-CM

## 2025-05-21 DIAGNOSIS — E78.2 MULTIPLE-TYPE HYPERLIPIDEMIA: ICD-10-CM

## 2025-05-27 ENCOUNTER — LAB (OUTPATIENT)
Dept: FAMILY MEDICINE CLINIC | Facility: CLINIC | Age: 50
End: 2025-05-27
Payer: COMMERCIAL

## 2025-05-27 LAB
ALBUMIN SERPL-MCNC: 4.2 G/DL (ref 3.5–5.2)
ALBUMIN/GLOB SERPL: 1.5 G/DL
ALP SERPL-CCNC: 37 U/L (ref 39–117)
ALT SERPL W P-5'-P-CCNC: 24 U/L (ref 1–41)
ANION GAP SERPL CALCULATED.3IONS-SCNC: 9.1 MMOL/L (ref 5–15)
AST SERPL-CCNC: 22 U/L (ref 1–40)
BILIRUB SERPL-MCNC: 0.5 MG/DL (ref 0–1.2)
BUN SERPL-MCNC: 10 MG/DL (ref 6–20)
BUN/CREAT SERPL: 11 (ref 7–25)
CALCIUM SPEC-SCNC: 9.2 MG/DL (ref 8.6–10.5)
CHLORIDE SERPL-SCNC: 105 MMOL/L (ref 98–107)
CHOLEST SERPL-MCNC: 194 MG/DL (ref 0–200)
CO2 SERPL-SCNC: 25.9 MMOL/L (ref 22–29)
CREAT SERPL-MCNC: 0.91 MG/DL (ref 0.76–1.27)
EGFRCR SERPLBLD CKD-EPI 2021: 104 ML/MIN/1.73
GLOBULIN UR ELPH-MCNC: 2.8 GM/DL
GLUCOSE SERPL-MCNC: 124 MG/DL (ref 65–99)
HDLC SERPL-MCNC: 66 MG/DL (ref 40–60)
LDLC SERPL CALC-MCNC: 113 MG/DL (ref 0–100)
LDLC/HDLC SERPL: 1.7 {RATIO}
POTASSIUM SERPL-SCNC: 4.7 MMOL/L (ref 3.5–5.2)
PROT SERPL-MCNC: 7 G/DL (ref 6–8.5)
SODIUM SERPL-SCNC: 140 MMOL/L (ref 136–145)
TRIGL SERPL-MCNC: 80 MG/DL (ref 0–150)
VLDLC SERPL-MCNC: 15 MG/DL (ref 5–40)

## 2025-05-27 PROCEDURE — 80053 COMPREHEN METABOLIC PANEL: CPT | Performed by: NURSE PRACTITIONER

## 2025-05-27 PROCEDURE — 82043 UR ALBUMIN QUANTITATIVE: CPT | Performed by: NURSE PRACTITIONER

## 2025-05-27 PROCEDURE — 36415 COLL VENOUS BLD VENIPUNCTURE: CPT | Performed by: NURSE PRACTITIONER

## 2025-05-27 PROCEDURE — 80061 LIPID PANEL: CPT | Performed by: NURSE PRACTITIONER

## 2025-05-27 PROCEDURE — 82570 ASSAY OF URINE CREATININE: CPT | Performed by: NURSE PRACTITIONER

## 2025-05-27 PROCEDURE — 83036 HEMOGLOBIN GLYCOSYLATED A1C: CPT | Performed by: NURSE PRACTITIONER

## 2025-05-27 PROCEDURE — 85027 COMPLETE CBC AUTOMATED: CPT | Performed by: NURSE PRACTITIONER

## 2025-05-28 LAB
ALBUMIN UR-MCNC: <1.2 MG/DL
CREAT UR-MCNC: 77.5 MG/DL
DEPRECATED RDW RBC AUTO: 44.4 FL (ref 37–54)
ERYTHROCYTE [DISTWIDTH] IN BLOOD BY AUTOMATED COUNT: 13.1 % (ref 12.3–15.4)
HBA1C MFR BLD: 6.3 % (ref 4.8–5.6)
HCT VFR BLD AUTO: 46.1 % (ref 37.5–51)
HGB BLD-MCNC: 15 G/DL (ref 13–17.7)
MCH RBC QN AUTO: 30.4 PG (ref 26.6–33)
MCHC RBC AUTO-ENTMCNC: 32.5 G/DL (ref 31.5–35.7)
MCV RBC AUTO: 93.3 FL (ref 79–97)
MICROALBUMIN/CREAT UR: NORMAL MG/G{CREAT}
PLATELET # BLD AUTO: 206 10*3/MM3 (ref 140–450)
PMV BLD AUTO: 11.1 FL (ref 6–12)
RBC # BLD AUTO: 4.94 10*6/MM3 (ref 4.14–5.8)
WBC NRBC COR # BLD AUTO: 5.58 10*3/MM3 (ref 3.4–10.8)

## 2025-06-02 DIAGNOSIS — E11.65 TYPE 2 DIABETES MELLITUS WITH HYPERGLYCEMIA, WITHOUT LONG-TERM CURRENT USE OF INSULIN: ICD-10-CM

## 2025-06-03 ENCOUNTER — OFFICE VISIT (OUTPATIENT)
Dept: FAMILY MEDICINE CLINIC | Facility: CLINIC | Age: 50
End: 2025-06-03
Payer: COMMERCIAL

## 2025-06-03 ENCOUNTER — LAB (OUTPATIENT)
Dept: FAMILY MEDICINE CLINIC | Facility: CLINIC | Age: 50
End: 2025-06-03
Payer: COMMERCIAL

## 2025-06-03 VITALS
TEMPERATURE: 98.1 F | DIASTOLIC BLOOD PRESSURE: 75 MMHG | WEIGHT: 181 LBS | HEART RATE: 57 BPM | SYSTOLIC BLOOD PRESSURE: 115 MMHG | HEIGHT: 67 IN | BODY MASS INDEX: 28.41 KG/M2 | OXYGEN SATURATION: 97 %

## 2025-06-03 DIAGNOSIS — Z00.00 PREVENTATIVE HEALTH CARE: Primary | ICD-10-CM

## 2025-06-03 DIAGNOSIS — E11.65 TYPE 2 DIABETES MELLITUS WITH HYPERGLYCEMIA, WITHOUT LONG-TERM CURRENT USE OF INSULIN: ICD-10-CM

## 2025-06-03 DIAGNOSIS — F41.9 ANXIETY: ICD-10-CM

## 2025-06-03 DIAGNOSIS — E03.9 ACQUIRED HYPOTHYROIDISM: ICD-10-CM

## 2025-06-03 DIAGNOSIS — G47.33 OSA (OBSTRUCTIVE SLEEP APNEA): ICD-10-CM

## 2025-06-03 PROCEDURE — 84443 ASSAY THYROID STIM HORMONE: CPT | Performed by: NURSE PRACTITIONER

## 2025-06-03 PROCEDURE — 36415 COLL VENOUS BLD VENIPUNCTURE: CPT | Performed by: NURSE PRACTITIONER

## 2025-06-03 PROCEDURE — 99396 PREV VISIT EST AGE 40-64: CPT | Performed by: NURSE PRACTITIONER

## 2025-06-03 RX ORDER — ESCITALOPRAM OXALATE 5 MG/1
5 TABLET ORAL DAILY
Qty: 90 TABLET | Refills: 1 | Status: SHIPPED | OUTPATIENT
Start: 2025-06-03

## 2025-06-03 NOTE — PROGRESS NOTES
Chief Complaint  Chief Complaint   Patient presents with    Annual Exam     Labs 5/27/25  Patient is doing well today           Subjective          Naveen Zapata presents to [facility] for   History of Present Illness    49-year-old male patient presents for annual exam    Diabetes mellitus type II, feels stable on meds, denies any signs/symptoms of hyper/hypoglycemia, blurry vision, polydipsia, polyuria, nocturia, and unintentional weight loss    Hypothyroidism, stable on medication, denies symptoms of constipation, weight gain/loss, hot or cold intolerance, hair loss, abnormal heart rate and fatigue.     Anxiety, doing well on Lexapro, working on stress relieving tactics.  Patient denies significant weight loss/gain, insomnia, hypersomnia, psychomotor agitation, psychomotor retardation, fatigue (loss of energy), feelings of worthlessness (guilt), impaired concentration (indecisiveness), thoughts of death or suicide.       Hyperlipidemia, the patient denies muscle aches, constipation, diarrhea, GI upset, fatigue, chest pain/pressure, exercise intolerance, dyspnea, palpitations, syncope and pedal edema.      Mild YOEL, not using CPAP      The 10-year ASCVD risk score (Genia XIONG, et al., 2019) is: 3.1%    Values used to calculate the score:      Age: 49 years      Sex: Male      Is Non- : No      Diabetic: Yes      Tobacco smoker: No      Systolic Blood Pressure: 115 mmHg      Is BP treated: No      HDL Cholesterol: 66 mg/dL      Total Cholesterol: 194 mg/dL        The following portions of the patient's history were reviewed and updated as appropriate: allergies, current medications, past family history, past medical history, past social history, past surgical history and problem list.    Past Medical History:   Diagnosis Date    History of echocardiogram 10/23/2019    History of stress test 10/24/2019    Hypothyroidism     Syncope and collapse     Type 2  diabetes mellitus      Past Surgical History:   Procedure Laterality Date    SHOULDER ARTHROSCOPY Right 10/02/2020     Family History   Problem Relation Age of Onset    Diabetes Mother     Heart disease Mother     Diabetes Father     Heart attack Father     Stroke Father     Heart disease Father     Diabetes Maternal Grandmother     Diabetes Maternal Grandfather     Diabetes Paternal Grandmother     Diabetes Paternal Grandfather      Social History     Tobacco Use    Smoking status: Former     Current packs/day: 0.00     Average packs/day: 0.6 packs/day for 20.0 years (12.0 ttl pk-yrs)     Types: Cigarettes     Start date: 1993     Quit date:      Years since quittin.4     Passive exposure: Past    Smokeless tobacco: Never    Tobacco comments:     Patient states that he has started back smoking 2024   Substance Use Topics    Alcohol use: No       Current Outpatient Medications:     clotrimazole-betamethasone (LOTRISONE) 1-0.05 % cream, APPLY TOPICALLY TO THE AFFECTED AREA TWICE DAILY AS DIRECTED, Disp: 45 g, Rfl: 1    escitalopram (LEXAPRO) 5 MG tablet, Take 1 tablet by mouth Daily., Disp: 90 tablet, Rfl: 1    glucose blood (FREESTYLE TEST STRIPS) test strip, Use to check blood glucose twice daily as needed DX: E11.65, Disp: 100 each, Rfl: 11    glucose monitoring kit (FREESTYLE) monitoring kit, Please provide glucometer on patient's formulary to check blood glucose twice daily. DX: E11.65, Disp: 1 each, Rfl: 0    Jardiance 25 MG tablet tablet, TAKE 1 TABLET BY MOUTH DAILY, Disp: 90 tablet, Rfl: 1    Lancets (FREESTYLE) lancets, Use to check blood glucose twice daily as needed DX: E11.65, Disp: 100 each, Rfl: 11    levothyroxine (SYNTHROID, LEVOTHROID) 125 MCG tablet, TAKE 1 TABLET BY MOUTH DAILY, Disp: 30 tablet, Rfl: 1    metFORMIN (GLUCOPHAGE) 1000 MG tablet, TAKE 1 TABLET BY MOUTH TWICE DAILY WITH MEALS, Disp: 180 tablet, Rfl: 1    Objective   Vital Signs:   Vitals:    25 0816   BP:  "115/75   BP Location: Left arm   Patient Position: Sitting   Cuff Size: Adult   Pulse: 57   Temp: 98.1 °F (36.7 °C)   TempSrc: Oral   SpO2: 97%   Weight: 82.1 kg (181 lb)   Height: 170.2 cm (67\")   PainSc: 0-No pain       Body mass index is 28.35 kg/m².    Physical Exam  Constitutional:       General: He is not in acute distress.     Appearance: Normal appearance. He is well-developed. He is not ill-appearing or diaphoretic.   HENT:      Head: Normocephalic.   Eyes:      Conjunctiva/sclera: Conjunctivae normal.      Pupils: Pupils are equal, round, and reactive to light.   Neck:      Thyroid: No thyromegaly.      Vascular: No JVD.   Cardiovascular:      Rate and Rhythm: Normal rate and regular rhythm.      Heart sounds: Normal heart sounds. No murmur heard.  Pulmonary:      Effort: Pulmonary effort is normal. No respiratory distress.      Breath sounds: Normal breath sounds. No wheezing or rhonchi.   Abdominal:      General: Bowel sounds are normal. There is no distension.      Palpations: Abdomen is soft.      Tenderness: There is no abdominal tenderness.   Musculoskeletal:         General: No swelling or tenderness. Normal range of motion.      Cervical back: Normal range of motion and neck supple. No tenderness.   Lymphadenopathy:      Cervical: No cervical adenopathy.   Skin:     General: Skin is warm and dry.      Coloration: Skin is not jaundiced.      Findings: No erythema or rash.   Neurological:      General: No focal deficit present.      Mental Status: He is alert and oriented to person, place, and time. Mental status is at baseline.      Sensory: No sensory deficit.      Motor: No weakness.      Gait: Gait normal.   Psychiatric:         Mood and Affect: Mood normal.         Behavior: Behavior normal.         Thought Content: Thought content normal.         Judgment: Judgment normal.          Result Review :     No visits with results within 7 Day(s) from this visit.   Latest known visit with results is: "   Orders Only on 05/21/2025   Component Date Value Ref Range Status    WBC 05/27/2025 5.58  3.40 - 10.80 10*3/mm3 Final    RBC 05/27/2025 4.94  4.14 - 5.80 10*6/mm3 Final    Hemoglobin 05/27/2025 15.0  13.0 - 17.7 g/dL Final    Hematocrit 05/27/2025 46.1  37.5 - 51.0 % Final    MCV 05/27/2025 93.3  79.0 - 97.0 fL Final    MCH 05/27/2025 30.4  26.6 - 33.0 pg Final    MCHC 05/27/2025 32.5  31.5 - 35.7 g/dL Final    RDW 05/27/2025 13.1  12.3 - 15.4 % Final    RDW-SD 05/27/2025 44.4  37.0 - 54.0 fl Final    MPV 05/27/2025 11.1  6.0 - 12.0 fL Final    Platelets 05/27/2025 206  140 - 450 10*3/mm3 Final    Glucose 05/27/2025 124 (H)  65 - 99 mg/dL Final    BUN 05/27/2025 10.0  6.0 - 20.0 mg/dL Final    Creatinine 05/27/2025 0.91  0.76 - 1.27 mg/dL Final    Sodium 05/27/2025 140  136 - 145 mmol/L Final    Potassium 05/27/2025 4.7  3.5 - 5.2 mmol/L Final    Chloride 05/27/2025 105  98 - 107 mmol/L Final    CO2 05/27/2025 25.9  22.0 - 29.0 mmol/L Final    Calcium 05/27/2025 9.2  8.6 - 10.5 mg/dL Final    Total Protein 05/27/2025 7.0  6.0 - 8.5 g/dL Final    Albumin 05/27/2025 4.2  3.5 - 5.2 g/dL Final    ALT (SGPT) 05/27/2025 24  1 - 41 U/L Final    AST (SGOT) 05/27/2025 22  1 - 40 U/L Final    Alkaline Phosphatase 05/27/2025 37 (L)  39 - 117 U/L Final    Total Bilirubin 05/27/2025 0.5  0.0 - 1.2 mg/dL Final    Globulin 05/27/2025 2.8  gm/dL Final    A/G Ratio 05/27/2025 1.5  g/dL Final    BUN/Creatinine Ratio 05/27/2025 11.0  7.0 - 25.0 Final    Anion Gap 05/27/2025 9.1  5.0 - 15.0 mmol/L Final    eGFR 05/27/2025 104.0  >60.0 mL/min/1.73 Final    Hemoglobin A1C 05/27/2025 6.30 (H)  4.80 - 5.60 % Final    Total Cholesterol 05/27/2025 194  0 - 200 mg/dL Final    Triglycerides 05/27/2025 80  0 - 150 mg/dL Final    HDL Cholesterol 05/27/2025 66 (H)  40 - 60 mg/dL Final    LDL Cholesterol  05/27/2025 113 (H)  0 - 100 mg/dL Final    VLDL Cholesterol 05/27/2025 15  5 - 40 mg/dL Final    LDL/HDL Ratio 05/27/2025 1.70   Final     Microalbumin/Creatinine Ratio 05/27/2025    Final    Unable to calculate    Creatinine, Urine 05/27/2025 77.5  mg/dL Final    Microalbumin, Urine 05/27/2025 <1.2  mg/dL Final                  BMI is >= 25 and <30. (Overweight) The following options were offered after discussion;: exercise counseling/recommendations and nutrition counseling/recommendations           Assessment and Plan    Diagnoses and all orders for this visit:    1. Preventative health care (Primary)    2. Acquired hypothyroidism  -     TSH    3. Type 2 diabetes mellitus with hyperglycemia, without long-term current use of insulin    4. Anxiety  -     escitalopram (LEXAPRO) 5 MG tablet; Take 1 tablet by mouth Daily.  Dispense: 90 tablet; Refill: 1    5. YOEL (obstructive sleep apnea)      Overall doing well  Cont current med regimen, refills as above and when needed   Anxiety stable  Labs reviewed with patient  - A1c stable at 6.3, continue diabetic diet, lowering carbohydrates, rec low carb bread/buns  -lipids improved, cont exercising/gym 4-5 days/week  Age appropriate preventative counseling provided, including healthy lifestyle modifications and exercise  Send Cologuard up-to-date      I spent 30 minutes caring for Naveen Zapata on this date of service. This time includes time spent by me in the following activities: preparing for the visit, reviewing tests, performing a medically appropriate examination and/or evaluation , counseling and educating the patient/family/caregiver, ordering medications, tests, or procedures and documenting information in the medical record        Follow Up     Return in about 6 months (around 12/3/2025) for Recheck. DM panel, PSA and TSH prior to appt .  Patient was given instructions and counseling regarding his condition or for health maintenance advice. Please see specific information pulled into the AVS if appropriate.        Part of this note may be an electronic transcription/translation of spoken language to  printed text using the Dragon Dictation System

## 2025-06-04 ENCOUNTER — RESULTS FOLLOW-UP (OUTPATIENT)
Dept: FAMILY MEDICINE CLINIC | Facility: CLINIC | Age: 50
End: 2025-06-04
Payer: COMMERCIAL

## 2025-06-04 DIAGNOSIS — E03.9 ACQUIRED HYPOTHYROIDISM: ICD-10-CM

## 2025-06-04 LAB — TSH SERPL DL<=0.05 MIU/L-ACNC: 1.68 UIU/ML (ref 0.27–4.2)

## 2025-06-04 RX ORDER — LEVOTHYROXINE SODIUM 125 UG/1
125 TABLET ORAL DAILY
Qty: 90 TABLET | Refills: 1 | Status: SHIPPED | OUTPATIENT
Start: 2025-06-04